# Patient Record
Sex: MALE | Race: WHITE | NOT HISPANIC OR LATINO | Employment: STUDENT | URBAN - METROPOLITAN AREA
[De-identification: names, ages, dates, MRNs, and addresses within clinical notes are randomized per-mention and may not be internally consistent; named-entity substitution may affect disease eponyms.]

---

## 2017-01-20 ENCOUNTER — PATIENT MESSAGE (OUTPATIENT)
Dept: PEDIATRICS | Facility: CLINIC | Age: 22
End: 2017-01-20

## 2017-01-20 ENCOUNTER — OFFICE VISIT (OUTPATIENT)
Dept: PEDIATRICS | Facility: CLINIC | Age: 22
End: 2017-01-20
Payer: COMMERCIAL

## 2017-01-20 VITALS
RESPIRATION RATE: 18 BRPM | BODY MASS INDEX: 20.13 KG/M2 | SYSTOLIC BLOOD PRESSURE: 107 MMHG | HEART RATE: 91 BPM | TEMPERATURE: 99 F | DIASTOLIC BLOOD PRESSURE: 69 MMHG | WEIGHT: 128.5 LBS

## 2017-01-20 DIAGNOSIS — J02.9 PHARYNGITIS, UNSPECIFIED ETIOLOGY: Primary | ICD-10-CM

## 2017-01-20 DIAGNOSIS — G40.909 SEIZURE DISORDER: ICD-10-CM

## 2017-01-20 DIAGNOSIS — R50.9 OTHER SPECIFIED FEVER: ICD-10-CM

## 2017-01-20 LAB
CTP QC/QA: YES
S PYO RRNA THROAT QL PROBE: NEGATIVE

## 2017-01-20 PROCEDURE — 87081 CULTURE SCREEN ONLY: CPT

## 2017-01-20 PROCEDURE — 99999 PR PBB SHADOW E&M-EST. PATIENT-LVL III: CPT | Mod: PBBFAC,,, | Performed by: PEDIATRICS

## 2017-01-20 PROCEDURE — 99213 OFFICE O/P EST LOW 20 MIN: CPT | Mod: S$GLB,,, | Performed by: PEDIATRICS

## 2017-01-20 PROCEDURE — 87880 STREP A ASSAY W/OPTIC: CPT | Mod: QW,S$GLB,, | Performed by: PEDIATRICS

## 2017-01-20 NOTE — MR AVS SNAPSHOT
[facility]  Erik Mazariegos eddie [place] [address]  Phone: [phone]                  Eddie Light   2017 2:00 PM   Office Visit    Description:  Male : 1995   Provider:  Shahrzad Saldivar MD   Department:  [facility]           Reason for Visit     Sore Throat     Fever                To Do List           Future Appointments        Provider Department Dept Phone    2017 2:00 PM Shahrzad Saldivar MD [facility] [phone]      Goals (5 Years of Data)     None      [facility]     [facility] -  Assistance  Registered nurses in the [facility] provide clinical advisement, health education, appointment booking, and other advisory services.  Call for this free service at [phone].             Medications           Message regarding Medications     Verify the changes and/or additions to your medication regime listed below are the same as discussed with your clinician today.  If any of these changes or additions are incorrect, please notify your healthcare provider.             Verify that the below list of medications is an accurate representation of the medications you are currently taking.  If none reported, the list may be blank. If incorrect, please contact your healthcare provider. Carry this list with you in case of emergency.           Current Medications     levetiracetam (KEPPRA) 500 MG Tab Take 1 tablet (500 mg total) by mouth 2 (two) times daily.    multivitamin (ONE DAILY MULTIVITAMIN) per tablet Take 1 tablet by mouth once daily.    lisdexamfetamine (VYVANSE) 50 MG capsule Take 1 capsule (50 mg total) by mouth every morning.           Clinical Reference Information           Vital Signs - Last Recorded  Most recent update: 2017  1:59 PM by Gabriela To MA    BP Pulse Temp Resp Wt BMI    107/69 91 98.7 °F (37.1 °C) (Oral) 18 58.3 kg (128 lb 8.5 oz) (11 %, Z=  -1.25)* 20.13 kg/m2 (15 %, Z= -1.05)*    *Growth percentiles are based on [facility] 2-20 Years data.      Blood Pressure          Most Recent Value    BP  107/69      Allergies as of 1/20/2017     No Known Allergies      Immunizations Administered on Date of Encounter - 1/20/2017     None

## 2017-01-20 NOTE — PROGRESS NOTES
Patient presents for visit accompanied by parent  Dad    CC: throat    HPI: Reports throat concern: for 2 days Hurts more to swallow Pain is mild to moderate at times Worse today. Throat looks red. Has had fever No headache   Denies cough, congestion No vomiting  No ear pain No diarrhea.    IMMUNIZATIONS:reviewed  PMHx reviewed  Medications and allergies reviewed  SH:lives with family  ROS:   CONSTITUTIONAL:alert, interactive   EYES:no eye discharge   ENT:see HPI   RESP:nl breathing, no wheezing or shortness of breath   GI:see HPI   SKIN:no rash  PHYS. EXAM:vital signs have reviewed   GEN:well nourished, well developed. Pain 0/10   SKIN:normal skin turgor, no lesions    EYES:PERRLA, nl conjunctiva   EARS:nl pinnae, TM's intact, right TM nl, left TM nl   NASAL:mucosa pink, no congestion, no discharge, oropharynx-mucus membranes moist, pharynx erythema   LYMPH:no cervical nodes    NECK:supple, no masses   RESP:nl resp. effort, clear to auscultation   HEART:RRR no murmur   ABD: positive BS, soft NT/ND   MS:nl tone and motor movement of extremities   PSYCH:in no acute distress, appropriate and interactive    ORDERS:strep test, culture done if strep negative    IMP:pharyngitis  Fever  Seizure disorder    PLAN:  Treat pain or fever with acetaminophen or Ibuprofen as directed   Education push clear fluids,soft bland foods;   Education on safe use of lozenges and gargle if age appropriate  Education cause and treatment.  Ed risk seizure when sick  keppra only daily now as he was too tired for it.  Call with concerns.Return if symptoms persist, worsen, or if new signs or symptoms develop. Follow up at well check and prn.

## 2017-01-23 LAB — BACTERIA THROAT CULT: NORMAL

## 2017-05-03 DIAGNOSIS — H91.93 HEARING DIFFICULTY, BILATERAL: Primary | ICD-10-CM

## 2017-05-04 ENCOUNTER — OFFICE VISIT (OUTPATIENT)
Dept: OTOLARYNGOLOGY | Facility: CLINIC | Age: 22
End: 2017-05-04
Payer: COMMERCIAL

## 2017-05-04 ENCOUNTER — CLINICAL SUPPORT (OUTPATIENT)
Dept: AUDIOLOGY | Facility: CLINIC | Age: 22
End: 2017-05-04
Payer: COMMERCIAL

## 2017-05-04 VITALS
DIASTOLIC BLOOD PRESSURE: 74 MMHG | HEART RATE: 76 BPM | WEIGHT: 131.38 LBS | BODY MASS INDEX: 19.46 KG/M2 | SYSTOLIC BLOOD PRESSURE: 106 MMHG | HEIGHT: 69 IN

## 2017-05-04 DIAGNOSIS — Z01.10 PASSED HEARING SCREENING: Primary | ICD-10-CM

## 2017-05-04 DIAGNOSIS — H91.93 HEARING DIFFICULTY, BILATERAL: ICD-10-CM

## 2017-05-04 DIAGNOSIS — Z01.10 EXAMINATION OF EARS AND HEARING: Primary | ICD-10-CM

## 2017-05-04 PROCEDURE — 92557 COMPREHENSIVE HEARING TEST: CPT | Mod: S$GLB,,, | Performed by: AUDIOLOGIST

## 2017-05-04 PROCEDURE — 99999 PR PBB SHADOW E&M-EST. PATIENT-LVL III: CPT | Mod: PBBFAC,,, | Performed by: NURSE PRACTITIONER

## 2017-05-04 PROCEDURE — 92567 TYMPANOMETRY: CPT | Mod: S$GLB,,, | Performed by: AUDIOLOGIST

## 2017-05-04 PROCEDURE — 99203 OFFICE O/P NEW LOW 30 MIN: CPT | Mod: S$GLB,,, | Performed by: NURSE PRACTITIONER

## 2017-05-04 NOTE — PROGRESS NOTES
Audiological evaluation completed per order from Krystal Mon due to concerns about patient's hearing.     Audiogram results were reviewed in detail with patient and all questions were answered.    Results indicate hearing within amy limits, bilaterally.   Discussed the option of pursuing Auditory Processing Testing to rule out Auditory Processing Disorder.

## 2017-05-04 NOTE — PROGRESS NOTES
Subjective:       Patient ID: Eddie Light is a 21 y.o. male.    Chief Complaint: Hearing Loss    HPI   Patient has difficulty making out words. Not sure if hearing loss. 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 No h/o failed hearing test in the past.     Review of Systems   Constitutional: Negative.    HENT: Positive for hearing loss.    Eyes: Negative.    Respiratory: Negative.    Cardiovascular: Negative.    Gastrointestinal: Negative.    Musculoskeletal: Negative.    Skin: Negative.    Neurological: Negative.    Hematological: Negative.    Psychiatric/Behavioral: Negative.        Objective:      Physical Exam   Constitutional: He is oriented to person, place, and time. Vital signs are normal. He appears well-developed and well-nourished. He is cooperative. He does not appear ill. No distress.   HENT:   Head: Normocephalic and atraumatic.   Right Ear: Hearing, tympanic membrane, external ear and ear canal normal. Tympanic membrane is not erythematous. No middle ear effusion.   Left Ear: Hearing, tympanic membrane, external ear and ear canal normal. Tympanic membrane is not erythematous.  No middle ear effusion.   Nose: Nose normal. No mucosal edema or rhinorrhea. Right sinus exhibits no maxillary sinus tenderness and no frontal sinus tenderness. Left sinus exhibits no maxillary sinus tenderness and no frontal sinus tenderness.   Mouth/Throat: Uvula is midline, oropharynx is clear and moist and mucous membranes are normal. Mucous membranes are not pale, not dry and not cyanotic. No oral lesions. No oropharyngeal exudate, posterior oropharyngeal edema or posterior oropharyngeal erythema.   Eyes: Conjunctivae, EOM and lids are normal. Pupils are equal, round, and reactive to light. Right eye exhibits no discharge. Left eye exhibits no discharge. No scleral icterus.   Neck: Trachea normal and normal range of motion. Neck supple. No tracheal deviation present. No thyroid mass and  no thyromegaly present.   Cardiovascular: Normal rate.    Pulmonary/Chest: Effort normal. No stridor. No respiratory distress. He has no wheezes.   Musculoskeletal: Normal range of motion.   Lymphadenopathy:        Head (right side): No submental, no submandibular, no tonsillar, no preauricular and no posterior auricular adenopathy present.        Head (left side): No submental, no submandibular, no tonsillar, no preauricular and no posterior auricular adenopathy present.     He has no cervical adenopathy.        Right cervical: No superficial cervical and no posterior cervical adenopathy present.       Left cervical: No superficial cervical and no posterior cervical adenopathy present.   Neurological: He is alert and oriented to person, place, and time. He has normal strength. Coordination and gait normal.   Skin: Skin is warm, dry and intact. No lesion and no rash noted. He is not diaphoretic. No cyanosis. No pallor.   Psychiatric: He has a normal mood and affect. His speech is normal and behavior is normal. Judgment and thought content normal. Cognition and memory are normal.   Nursing note and vitals reviewed.      Assessment:     Hearing is WNL AU with excellent speech discrimination AU      Plan:     Reassurance    Copy of audiogram given  Return to clinic as needed for any ENT concerns

## 2017-09-12 ENCOUNTER — PATIENT MESSAGE (OUTPATIENT)
Dept: NEUROLOGY | Facility: CLINIC | Age: 22
End: 2017-09-12

## 2017-09-12 RX ORDER — LEVETIRACETAM 500 MG/1
TABLET ORAL
Qty: 60 TABLET | Refills: 0 | Status: SHIPPED | OUTPATIENT
Start: 2017-09-12 | End: 2017-11-07 | Stop reason: SDUPTHER

## 2017-09-12 NOTE — TELEPHONE ENCOUNTER
The patient has not been seen in >1 year.  This refill has been approved, but follow up will be necessary for further refills.

## 2017-09-13 ENCOUNTER — TELEPHONE (OUTPATIENT)
Dept: NEUROLOGY | Facility: CLINIC | Age: 22
End: 2017-09-13

## 2017-09-13 NOTE — TELEPHONE ENCOUNTER
----- Message from Mark Petit sent at 9/13/2017  9:22 AM CDT -----  Contact: Mother - Moon Light  States that the patient had another seizure last Sunday, 09/10/2017 and would like to make a follow up appointment to be seen.  Can you please call Moon back at [phone].  Thank you

## 2017-09-13 NOTE — TELEPHONE ENCOUNTER
Returned call and spoke with patient's mother Moon. Moon stated that the patient did miss his medication for 3 days prior. Patient received Black eye from seizure. Patient went to [place] and spent 3 hours. Patient had a CT done. CT was normal and patient was discharged. Moon stated that when he does take the medication it works. Instruct to make sure the patient takes his medication as prescribed and for him not to miss a dose. Reminded her that the patient can not drive, be around power tools, bodies of water, and must take showers not bathes. Moon verbalized understanding. Please advise.

## 2017-09-14 NOTE — TELEPHONE ENCOUNTER
I saw this patient once, and that was over a year and a half ago.  He should have had follow up well before now.  Did an appointment not get made or did they cancel/no show and not reschedule?  In either case, he needs to have a follow up appointment made.  The next available slot is fine.

## 2017-11-07 RX ORDER — LEVETIRACETAM 500 MG/1
TABLET ORAL
Qty: 60 TABLET | Refills: 0 | Status: SHIPPED | OUTPATIENT
Start: 2017-11-07 | End: 2018-01-09 | Stop reason: SDUPTHER

## 2017-11-13 ENCOUNTER — OFFICE VISIT (OUTPATIENT)
Dept: FAMILY MEDICINE | Facility: CLINIC | Age: 22
End: 2017-11-13
Payer: COMMERCIAL

## 2017-11-13 ENCOUNTER — LAB VISIT (OUTPATIENT)
Dept: LAB | Facility: HOSPITAL | Age: 22
End: 2017-11-13
Attending: NURSE PRACTITIONER
Payer: COMMERCIAL

## 2017-11-13 VITALS
HEART RATE: 75 BPM | BODY MASS INDEX: 19.79 KG/M2 | WEIGHT: 133.63 LBS | HEIGHT: 69 IN | SYSTOLIC BLOOD PRESSURE: 110 MMHG | OXYGEN SATURATION: 98 % | RESPIRATION RATE: 20 BRPM | TEMPERATURE: 98 F | DIASTOLIC BLOOD PRESSURE: 70 MMHG

## 2017-11-13 DIAGNOSIS — K59.00 CONSTIPATION, UNSPECIFIED CONSTIPATION TYPE: ICD-10-CM

## 2017-11-13 DIAGNOSIS — Z00.00 WELLNESS EXAMINATION: ICD-10-CM

## 2017-11-13 DIAGNOSIS — G40.909 SEIZURE DISORDER: ICD-10-CM

## 2017-11-13 DIAGNOSIS — Z00.00 WELLNESS EXAMINATION: Primary | ICD-10-CM

## 2017-11-13 LAB
ALBUMIN SERPL BCP-MCNC: 4 G/DL
ALP SERPL-CCNC: 89 U/L
ALT SERPL W/O P-5'-P-CCNC: 191 U/L
ANION GAP SERPL CALC-SCNC: 7 MMOL/L
AST SERPL-CCNC: 577 U/L
BASOPHILS # BLD AUTO: 0.02 K/UL
BASOPHILS NFR BLD: 0.5 %
BILIRUB SERPL-MCNC: 1 MG/DL
BUN SERPL-MCNC: 10 MG/DL
CALCIUM SERPL-MCNC: 9.7 MG/DL
CHLORIDE SERPL-SCNC: 104 MMOL/L
CHOLEST SERPL-MCNC: 165 MG/DL
CHOLEST/HDLC SERPL: 3.6 {RATIO}
CO2 SERPL-SCNC: 29 MMOL/L
CREAT SERPL-MCNC: 0.9 MG/DL
DIFFERENTIAL METHOD: NORMAL
EOSINOPHIL # BLD AUTO: 0.1 K/UL
EOSINOPHIL NFR BLD: 1.4 %
ERYTHROCYTE [DISTWIDTH] IN BLOOD BY AUTOMATED COUNT: 12.7 %
EST. GFR  (AFRICAN AMERICAN): >60 ML/MIN/1.73 M^2
EST. GFR  (NON AFRICAN AMERICAN): >60 ML/MIN/1.73 M^2
GLUCOSE SERPL-MCNC: 80 MG/DL
HCT VFR BLD AUTO: 42.7 %
HDLC SERPL-MCNC: 46 MG/DL
HDLC SERPL: 27.9 %
HGB BLD-MCNC: 14 G/DL
IMM GRANULOCYTES # BLD AUTO: 0.01 K/UL
IMM GRANULOCYTES NFR BLD AUTO: 0.2 %
LDLC SERPL CALC-MCNC: 106.8 MG/DL
LYMPHOCYTES # BLD AUTO: 1.5 K/UL
LYMPHOCYTES NFR BLD: 33.3 %
MCH RBC QN AUTO: 28.6 PG
MCHC RBC AUTO-ENTMCNC: 32.8 G/DL
MCV RBC AUTO: 87 FL
MONOCYTES # BLD AUTO: 0.4 K/UL
MONOCYTES NFR BLD: 9.3 %
NEUTROPHILS # BLD AUTO: 2.4 K/UL
NEUTROPHILS NFR BLD: 55.3 %
NONHDLC SERPL-MCNC: 119 MG/DL
NRBC BLD-RTO: 0 /100 WBC
PLATELET # BLD AUTO: 224 K/UL
PMV BLD AUTO: 11.1 FL
POTASSIUM SERPL-SCNC: 4.5 MMOL/L
PROT SERPL-MCNC: 7.1 G/DL
RBC # BLD AUTO: 4.9 M/UL
SODIUM SERPL-SCNC: 140 MMOL/L
TRIGL SERPL-MCNC: 61 MG/DL
TSH SERPL DL<=0.005 MIU/L-ACNC: 0.55 UIU/ML
WBC # BLD AUTO: 4.39 K/UL

## 2017-11-13 PROCEDURE — 84443 ASSAY THYROID STIM HORMONE: CPT

## 2017-11-13 PROCEDURE — 80061 LIPID PANEL: CPT

## 2017-11-13 PROCEDURE — 99999 PR PBB SHADOW E&M-EST. PATIENT-LVL III: CPT | Mod: PBBFAC,,, | Performed by: NURSE PRACTITIONER

## 2017-11-13 PROCEDURE — 80053 COMPREHEN METABOLIC PANEL: CPT

## 2017-11-13 PROCEDURE — 99204 OFFICE O/P NEW MOD 45 MIN: CPT | Mod: S$GLB,,, | Performed by: NURSE PRACTITIONER

## 2017-11-13 PROCEDURE — 85025 COMPLETE CBC W/AUTO DIFF WBC: CPT

## 2017-11-13 PROCEDURE — 36415 COLL VENOUS BLD VENIPUNCTURE: CPT | Mod: PO

## 2017-11-13 NOTE — PROGRESS NOTES
Subjective:       Patient ID: Eddie Light is a 22 y.o. male.    Chief Complaint: Annual Exam    HPI   Mr. Light is a new patient to me and family practice. He presents today to establish care/wellness exam. Was being followed by zeyad Choi. PMHX includes seizure disorder (preemie). Followed by neuro--next appt with Dr Rowell on 11/20. Last seizure 9/10/17.   Vitals:    11/13/17 1209   BP: 110/70   Pulse: 75   Resp: 20   Temp: 97.6 °F (36.4 °C)     Review of Systems   Constitutional: Negative.  Negative for diaphoresis and fever.   HENT: Negative.  Negative for facial swelling and trouble swallowing.    Eyes: Negative.  Negative for discharge and redness.   Respiratory: Negative.  Negative for cough and shortness of breath.    Cardiovascular: Negative.  Negative for chest pain and palpitations.   Gastrointestinal: Positive for constipation. Negative for abdominal pain and diarrhea.   Genitourinary: Negative.  Negative for difficulty urinating and flank pain.   Musculoskeletal: Negative.  Negative for back pain and neck pain.   Skin: Negative.  Negative for rash and wound.   Neurological: Negative.  Negative for dizziness and light-headedness.   Hematological: Negative.    Psychiatric/Behavioral: Negative.  Negative for confusion. The patient is not nervous/anxious.        Past Medical History:   Diagnosis Date    ADHD (attention deficit hyperactivity disorder)     Headache(784.0)     Nevus     seen by Derm    Otitis media     Seizures 2006    x 1    Short stature for age     Dr. Elena Garcia    Strep throat     Wears glasses     Dr. Lara     Objective:      Physical Exam   Constitutional: He is oriented to person, place, and time. He does not have a sickly appearance. No distress.   HENT:   Head: Normocephalic.   Right Ear: Hearing normal.   Left Ear: Hearing normal.   Nose: Nose normal.   Eyes: Conjunctivae and lids are normal.   Neck: Trachea normal. No JVD present.   Cardiovascular:  Normal rate, regular rhythm, S1 normal, S2 normal and normal heart sounds.    Pulmonary/Chest: Effort normal and breath sounds normal. He exhibits no tenderness.   Abdominal: Normal appearance. He exhibits no distension.   Musculoskeletal: Normal range of motion. He exhibits no edema or deformity.   Neurological: He is alert and oriented to person, place, and time.   Skin: Skin is warm and dry. He is not diaphoretic. No pallor.   Psychiatric: He has a normal mood and affect. His speech is normal and behavior is normal. Judgment and thought content normal. Cognition and memory are normal.   Nursing note and vitals reviewed.      Assessment:       1. Wellness examination    2. Seizure disorder    3. Constipation, unspecified constipation type        Plan:       Wellness examination  -     CBC auto differential; Future; Expected date: 11/13/2017  -     Comprehensive metabolic panel; Future; Expected date: 11/13/2017  -     Lipid panel; Future; Expected date: 11/13/2017  -     TSH; Future; Expected date: 11/13/2017    Seizure disorder   Follow up with Dr Rowell as scheduled next week    Constipation   Increase water intake   High fiber diet    Follow up to establish with Dr Aguilera  Follow up yearly and PRN

## 2017-11-14 ENCOUNTER — TELEPHONE (OUTPATIENT)
Dept: FAMILY MEDICINE | Facility: CLINIC | Age: 22
End: 2017-11-14

## 2017-11-14 ENCOUNTER — PATIENT MESSAGE (OUTPATIENT)
Dept: FAMILY MEDICINE | Facility: CLINIC | Age: 22
End: 2017-11-14

## 2017-11-14 DIAGNOSIS — R79.89 ELEVATED LFTS: Primary | ICD-10-CM

## 2017-11-14 NOTE — TELEPHONE ENCOUNTER
Please call and tell him his liver enzymes are elevated--need further workup. Ask him if he drinks alcohol and if so how much?    Schedule for labs and ultrasound

## 2017-11-14 NOTE — TELEPHONE ENCOUNTER
Attempted to contact patient. Spoke with patient's father. He will have patient return call to clinic.

## 2017-11-15 ENCOUNTER — PATIENT MESSAGE (OUTPATIENT)
Dept: FAMILY MEDICINE | Facility: CLINIC | Age: 22
End: 2017-11-15

## 2017-11-15 ENCOUNTER — HOSPITAL ENCOUNTER (OUTPATIENT)
Dept: RADIOLOGY | Facility: HOSPITAL | Age: 22
Discharge: HOME | End: 2017-11-15
Attending: NURSE PRACTITIONER
Payer: COMMERCIAL

## 2017-11-15 DIAGNOSIS — R79.89 ELEVATED LFTS: ICD-10-CM

## 2017-11-15 PROCEDURE — 76705 ECHO EXAM OF ABDOMEN: CPT | Mod: 26,,, | Performed by: RADIOLOGY

## 2017-11-15 PROCEDURE — 76705 ECHO EXAM OF ABDOMEN: CPT | Mod: TC,PO

## 2017-11-16 ENCOUNTER — PATIENT MESSAGE (OUTPATIENT)
Dept: FAMILY MEDICINE | Facility: CLINIC | Age: 22
End: 2017-11-16

## 2017-11-16 ENCOUNTER — TELEPHONE (OUTPATIENT)
Dept: FAMILY MEDICINE | Facility: CLINIC | Age: 22
End: 2017-11-16

## 2017-11-16 DIAGNOSIS — R82.998 DARK URINE: ICD-10-CM

## 2017-11-16 DIAGNOSIS — R79.89 ELEVATED LFTS: Primary | ICD-10-CM

## 2017-11-17 ENCOUNTER — PATIENT MESSAGE (OUTPATIENT)
Dept: FAMILY MEDICINE | Facility: CLINIC | Age: 22
End: 2017-11-17

## 2017-11-20 ENCOUNTER — OFFICE VISIT (OUTPATIENT)
Dept: NEUROLOGY | Facility: CLINIC | Age: 22
End: 2017-11-20
Payer: COMMERCIAL

## 2017-11-20 VITALS
HEIGHT: 69 IN | SYSTOLIC BLOOD PRESSURE: 108 MMHG | DIASTOLIC BLOOD PRESSURE: 63 MMHG | HEART RATE: 75 BPM | WEIGHT: 134.13 LBS | BODY MASS INDEX: 19.87 KG/M2 | RESPIRATION RATE: 20 BRPM

## 2017-11-20 DIAGNOSIS — G40.209 LOCALIZATION-RELATED SYMPTOMATIC EPILEPSY AND EPILEPTIC SYNDROMES WITH COMPLEX PARTIAL SEIZURES, NOT INTRACTABLE, WITHOUT STATUS EPILEPTICUS: Primary | ICD-10-CM

## 2017-11-20 DIAGNOSIS — G72.9 MYOPATHY: ICD-10-CM

## 2017-11-20 DIAGNOSIS — R74.01 TRANSAMINITIS: ICD-10-CM

## 2017-11-20 PROCEDURE — 99215 OFFICE O/P EST HI 40 MIN: CPT | Mod: S$GLB,,, | Performed by: PSYCHIATRY & NEUROLOGY

## 2017-11-20 PROCEDURE — 99999 PR PBB SHADOW E&M-EST. PATIENT-LVL III: CPT | Mod: PBBFAC,,, | Performed by: PSYCHIATRY & NEUROLOGY

## 2017-11-20 NOTE — PROGRESS NOTES
Date of service:  2017    Chief complaint:  Seizures    Interval history:  The patient is a 22 y.o. male seen once previously in  for seizures.  At that time, we tried transitioning him from Zonegran to Lamictal.  He reports that he had significant drowsiness.  We then transitioned to Keppra.  He notes no side effects on this.  Since I last saw him, he has had further seizures, most recently in .  His mother indicates that he has not been taking his medication faithfully with these.    History of present illness:  The patient is a 22 y.o. male referred for evaluation of episodes suspicious for seizures. These began at about age 10-12.  The patient has no consistent warning, though he says that he has seen a red light before one event.  His seizure is characterized by generalized stiffening and shaking.  There is a history of tongue biting.  This component of this spell lasts for approximately 2 minutes.  Afterwards, he reports confusion/fatigue.  The patient's frequency of events is roughly once a year.    The patient has no family history of seizures.  He reports a history of prenatal/ complications with the patient having been born prematurely. There is no history of febrile seizures.  He notes no history of CNS infections. He claims no history of significant head trauma. There is a history of developmental abnormalities on his MRI of the brain as described below.    He was initially placed on phenobarbital, which caused drowsiness.  He is now on Zonegran 200 mg nightly.  This may also be causing some drowsiness.    Also of note, the patient reports relatively frequent headaches as well as muscle aches.    Current AEDs:  Zonegran 200 mg QHS    Prior AEDs:  Phenobarbital (lethargy)      Past Medical History:   Diagnosis Date    ADHD (attention deficit hyperactivity disorder)     Headache(784.0)     Nevus     seen by Derm    Otitis media     Seizures 2006    x 1    Short stature for age   "   Dr. Elena Garcia    Strep throat     Wears glasses     Dr. Lara       Past Surgical History:   Procedure Laterality Date    HERNIA REPAIR      TYMPANOSTOMY TUBE PLACEMENT         Family history:  History reviewed. No pertinent family history.  No reported FH of seizures      Social history:  Social History     Social History    Marital status: Single     Spouse name: N/A    Number of children: N/A    Years of education: N/A     Occupational History    Not on file.     Social History Main Topics    Smoking status: Never Smoker    Smokeless tobacco: Never Used    Alcohol use No    Drug use: No    Sexual activity: Not on file     Other Topics Concern    Not on file     Social History Narrative    No narrative on file   No reported T/E/D       Review of Systems  General/Constitutional:  No unintentional weight loss, No change in appetite  Eyes/Vision:  No change in vision, No double vision  ENT:  No frequent nose bleeds, No ringing in the ears  Respiratory:  No cough, No wheezing  Cardiovascular:  No chest pain, No palpitations  Gastrointestinal:  No jaundice, No nausea/vomiting  Genitourinary:  No incontinence, No burning with urination  Hematologic/Lymphatic:  No easy bruising/bleeding, No night sweats  Neurological:  No numbness, No weakness  Endocrine:  + fatigue, No heat/cold intolerance  Allergy/Immunologic:  No fevers, No chills  Musculoskeletal:  + muscle pain, No joint pain   Psychiatric:  No thoughts of harming self/others, No depression  Integumentary:  No rashes, No sores that do not heal     Physical exam:  /63 (BP Location: Right arm, Patient Position: Sitting, BP Method: Medium (Automatic))   Pulse 75   Resp 20   Ht 5' 9" (1.753 m)   Wt 60.9 kg (134 lb 2.4 oz)   BMI 19.81 kg/m²   General: Well developed, well nourished.  No acute distress.  HEENT: Atraumatic, normocephalic.  Neck: Supple, trachea midline.  Cardiovascular: Regular rate and rhythm.  Pulmonary: No " "increased work of breathing.  Abdomen/GI: No guarding.  Musculoskeletal: No obvious joint deformities, moves all extremities well.    Neurological exam:   Mental status: Awake and alert.  Oriented x4.  Speech fluent and appropriate.  Recent and remote memory appear to be grossly intact.  Fund of knowledge grossly normal.  Cranial nerves: Pupils equal round and reactive to light, extraocular movements intact, facial strength and sensation intact bilaterally, palate and tongue midline, hearing grossly intact bilaterally.  Motor: 5 out of 5 strength throughout the upper and lower extremities bilaterally. Normal bulk and tone.  Sensation: Intact to light touch and temperature bilaterally.  DTR: 2+ at the knees and biceps bilaterally.  Coordination: Finger-nose-finger testing intact bilaterally.  Gait: Normal gait. Good tandem.    Data base:  Notes of the referring physician were reviewed.  Briefly summarized, these discussed his recent seizure history and indicated that the patient was being transitioned from phenobarbital to Zonegran secondary to lethargy.    Labs:  CMP (11/13/17): includes SZJ=618 and ALT=191  CBC (11/13/17): normal  CK (3/16/16): 211 (previously 305)    MRI brain (2012):  "1. 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  2. 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 This is not optimally evaluated secondary to motion artifact and metal artifact within the mouth.  3. Dysgenesis of the corpus callosum.  4. Fluid within right mastoid air cells. Please correlate clinically for symptoms of mastoiditis."    EEG (11/15):  "Normal EEG"    Assessment and plan:  The patient is a 22 y.o. male referred for evaluation for seizures. 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  As far as medications go, we will continue him on Keppra. Medication side effects were discussed " with the patient.  Serologies are pending for medication monitoring purposes.  State law as it pertains to driving for individuals with seizures was discussed.  The patient was also counseled on seizure safety.      We also discussed his recent elevated LFTs and history of mildly elevated CKs.  I did raise the question of a genetics consult, provided no obvious cause of hepatitis is found, as we might be seeing a manifestation of an underlying genetic issue in multiple organ systems at that point.  Certainly, there are a number of identified genetic etiologies for periventricular nodular heterotopia.  The classic version is X linked dominant and is usually fatal in males, so I would not suspect this diagnosis for Mr. Light's case.  An autosomal recessive ARFGEF2 mutation, it is my understanding, typically causes more severe cognitive issues than this patient suffers from.  Consequently, I think this less likely as well.  There are variants of Estelita-Danlos syndrome associated with both periventricular nodular heterotopia, and hepatic issues can also arise in Estelita-Danlos patients.  Muscle involvement with Estelita-Danlos is relatively rare.  Nonetheless, this may warrant further consideration.  Additionally, we could consider EMG testing and/or muscle biopsy.    We will plan on seeing the patient back in a few months.

## 2017-12-01 ENCOUNTER — LAB VISIT (OUTPATIENT)
Dept: LAB | Facility: HOSPITAL | Age: 22
End: 2017-12-01
Attending: NURSE PRACTITIONER
Payer: COMMERCIAL

## 2017-12-01 DIAGNOSIS — R79.89 ELEVATED LFTS: ICD-10-CM

## 2017-12-01 DIAGNOSIS — R82.998 DARK URINE: ICD-10-CM

## 2017-12-01 LAB
ALBUMIN SERPL BCP-MCNC: 4.2 G/DL
ALP SERPL-CCNC: 97 U/L
ALT SERPL W/O P-5'-P-CCNC: 17 U/L
AST SERPL-CCNC: 19 U/L
BILIRUB DIRECT SERPL-MCNC: 0.3 MG/DL
BILIRUB SERPL-MCNC: 0.7 MG/DL
CK SERPL-CCNC: 161 U/L
PROT SERPL-MCNC: 7.2 G/DL

## 2017-12-01 PROCEDURE — 36415 COLL VENOUS BLD VENIPUNCTURE: CPT | Mod: PO

## 2017-12-01 PROCEDURE — 80076 HEPATIC FUNCTION PANEL: CPT

## 2017-12-01 PROCEDURE — 82550 ASSAY OF CK (CPK): CPT

## 2017-12-04 ENCOUNTER — PATIENT MESSAGE (OUTPATIENT)
Dept: FAMILY MEDICINE | Facility: CLINIC | Age: 22
End: 2017-12-04

## 2017-12-04 ENCOUNTER — TELEPHONE (OUTPATIENT)
Dept: FAMILY MEDICINE | Facility: CLINIC | Age: 22
End: 2017-12-04

## 2017-12-04 NOTE — TELEPHONE ENCOUNTER
Please call and tell him repeat labs and urine were all normal---his liver enzyme elevation could have been related to a self-limiting virus. Advise him to follow up to establish with Dr Aguilera in February as scheduled

## 2017-12-05 NOTE — TELEPHONE ENCOUNTER
----- Message from Yamila Peters sent at 12/4/2017  2:06 PM CST -----  Contact: self  Call placed to pod. Returning your call. Please call back at [phone] (rigd)

## 2017-12-06 ENCOUNTER — OFFICE VISIT (OUTPATIENT)
Dept: OPTOMETRY | Facility: CLINIC | Age: 22
End: 2017-12-06
Payer: COMMERCIAL

## 2017-12-06 DIAGNOSIS — H52.13 MYOPIA WITH ASTIGMATISM, BILATERAL: Primary | ICD-10-CM

## 2017-12-06 DIAGNOSIS — H52.203 MYOPIA WITH ASTIGMATISM, BILATERAL: Primary | ICD-10-CM

## 2017-12-06 PROCEDURE — 99499 UNLISTED E&M SERVICE: CPT | Mod: S$GLB,,, | Performed by: OPTOMETRIST

## 2017-12-06 PROCEDURE — 92014 COMPRE OPH EXAM EST PT 1/>: CPT | Mod: S$GLB,,, | Performed by: OPTOMETRIST

## 2017-12-06 PROCEDURE — 92015 DETERMINE REFRACTIVE STATE: CPT | Mod: S$GLB,,, | Performed by: OPTOMETRIST

## 2017-12-06 PROCEDURE — 99999 PR PBB SHADOW E&M-EST. PATIENT-LVL II: CPT | Mod: PBBFAC,,, | Performed by: OPTOMETRIST

## 2017-12-06 NOTE — PROGRESS NOTES
[facility] [place]     Routine eye exam--dle--10/19/16    Pt denies any changes since last visit. Needing updated contact lens. No   eye pain. No floaters/flashes.        Last edited by Elza Carcamo on 12/6/2017  8:10 AM. (History)            Assessment /Plan     For exam results, see Encounter Report.    Myopia with astigmatism, bilateral      1. Spec Rx given and  Contact lens trials dispensed to pt. Daily wear only advised, with education to risks of extended wear.  Discussed lens care, compliance and solutions. Different lens options discussed with patient. RTC 1 year full exam.

## 2017-12-06 NOTE — PROGRESS NOTES
Assessment /Plan     For exam results, see Encounter Report.    Myopia with astigmatism, bilateral      1. See other exam same date for clfu.

## 2018-01-09 RX ORDER — LEVETIRACETAM 500 MG/1
500 TABLET ORAL 2 TIMES DAILY
Qty: 60 TABLET | Refills: 3 | Status: SHIPPED | OUTPATIENT
Start: 2018-01-09 | End: 2018-08-09

## 2018-01-09 RX ORDER — LEVETIRACETAM 500 MG/1
TABLET ORAL
Qty: 60 TABLET | Refills: 0 | Status: CANCELLED | OUTPATIENT
Start: 2018-01-09

## 2018-02-26 ENCOUNTER — OFFICE VISIT (OUTPATIENT)
Dept: FAMILY MEDICINE | Facility: CLINIC | Age: 23
End: 2018-02-26
Payer: COMMERCIAL

## 2018-02-26 VITALS
RESPIRATION RATE: 18 BRPM | DIASTOLIC BLOOD PRESSURE: 70 MMHG | SYSTOLIC BLOOD PRESSURE: 106 MMHG | OXYGEN SATURATION: 98 % | WEIGHT: 134.94 LBS | BODY MASS INDEX: 19.99 KG/M2 | HEIGHT: 69 IN | HEART RATE: 72 BPM

## 2018-02-26 DIAGNOSIS — H91.93 DECREASED HEARING OF BOTH EARS: ICD-10-CM

## 2018-02-26 DIAGNOSIS — G40.909 SEIZURE DISORDER: ICD-10-CM

## 2018-02-26 DIAGNOSIS — B35.1 ONYCHOMYCOSIS: Primary | ICD-10-CM

## 2018-02-26 PROCEDURE — 99999 PR PBB SHADOW E&M-EST. PATIENT-LVL III: CPT | Mod: PBBFAC,,, | Performed by: INTERNAL MEDICINE

## 2018-02-26 PROCEDURE — 3008F BODY MASS INDEX DOCD: CPT | Mod: S$GLB,,, | Performed by: INTERNAL MEDICINE

## 2018-02-26 PROCEDURE — 99214 OFFICE O/P EST MOD 30 MIN: CPT | Mod: S$GLB,,, | Performed by: INTERNAL MEDICINE

## 2018-02-26 RX ORDER — FLUCONAZOLE 200 MG/1
TABLET ORAL
Qty: 12 TABLET | Refills: 0 | Status: SHIPPED | OUTPATIENT
Start: 2018-02-26 | End: 2019-08-22

## 2018-02-26 NOTE — PROGRESS NOTES
Subjective:       Patient ID: Eddie Light is a 22 y.o. male.    Chief Complaint: Nail Problem    Complains of thick yellow nail for 1 year progressing despite otc tx by his mother  Seizure d/o - last seizure 5 mo ago.  On [facility]; Dr Rowell  Complains of mild decreased hearing for > 1 yr.  Hearing test here wnl - was told may be selective hearing.       Review of Systems   Constitutional: Negative for activity change, appetite change, fever and unexpected weight change.   HENT: Negative for hearing loss, nosebleeds, rhinorrhea and trouble swallowing.    Eyes: Negative for discharge and visual disturbance.   Respiratory: Negative for choking, chest tightness, shortness of breath and wheezing.    Cardiovascular: Negative for chest pain and palpitations.   Gastrointestinal: Negative for abdominal pain, blood in stool, constipation, diarrhea, nausea and vomiting.   Endocrine: Negative for polydipsia and polyuria.   Genitourinary: Negative for difficulty urinating, hematuria and urgency.   Musculoskeletal: Negative for arthralgias, joint swelling and neck pain.   Skin: Negative for rash and wound.   Neurological: Negative for dizziness, syncope, weakness and headaches.   Psychiatric/Behavioral: Negative for confusion and dysphoric mood.       Objective:      Vitals:    02/26/18 1511   BP: 106/70   Pulse: 72   Resp: 18     Physical Exam   Constitutional: He appears well-nourished.   HENT:   Scarring left TM   Eyes: Conjunctivae and EOM are normal.   Neck: Normal range of motion.   Cardiovascular: Normal rate and regular rhythm.    Pulmonary/Chest: Effort normal and breath sounds normal.   Musculoskeletal:   Normal ROM bilateral    Neurological: No cranial nerve deficit (grossly intact).   Skin: Skin is warm and dry.   Psychiatric: He has a normal mood and affect.   Alert and orientated   Vitals reviewed.        Assessment:       1. Onychomycosis    2. Seizure disorder    3. Decreased hearing of both ears        Plan:  "      Onychomycosis  -     fluconazole (DIFLUCAN) 200 MG Tab; 1 po q week for 12 weeks  Dispense: 12 tablet; Refill: 0  -     Comprehensive metabolic panel; Future; Expected date: 03/26/2018  -     CBC auto differential; Future; Expected date: 03/26/2018    Seizure disorder    Decreased hearing of both ears      Medication List with Changes/Refills   New Medications    FLUCONAZOLE (DIFLUCAN) 200 MG TAB    1 po q week for 12 weeks   Current Medications    ASPIRIN/ACETAMINOPHEN/CAFFEINE (EXCEDRIN MIGRAINE ORAL)    Take by mouth as needed.    LEVETIRACETAM (KEPPRA) 500 MG TAB    Take 1 tablet (500 mg total) by mouth 2 (two) times daily.    MULTIVITAMIN (ONE DAILY MULTIVITAMIN) PER TABLET    Take 1 tablet by mouth once daily.     Nw, podiatry   If hearing progresses, ENT Dr          Counseled on regular exercise, maintenance of a healthy weight, balanced diet rich in fruits/vegetables and lean protein, and avoidance of unhealthy habits like smoking and excessive alcohol intake.   Also, counseled on importance of being compliant with medication, health appointments, diet and exercise.     Follow-up if symptoms worsen or fail to improve. c w 1m labs    "This note will not be shared with the patient."  "

## 2018-03-26 ENCOUNTER — LAB VISIT (OUTPATIENT)
Dept: LAB | Facility: HOSPITAL | Age: 23
End: 2018-03-26
Attending: INTERNAL MEDICINE
Payer: COMMERCIAL

## 2018-03-26 DIAGNOSIS — B35.1 ONYCHOMYCOSIS: ICD-10-CM

## 2018-03-26 LAB
ALBUMIN SERPL BCP-MCNC: 4.2 G/DL
ALP SERPL-CCNC: 106 U/L
ALT SERPL W/O P-5'-P-CCNC: 19 U/L
ANION GAP SERPL CALC-SCNC: 8 MMOL/L
AST SERPL-CCNC: 27 U/L
BASOPHILS # BLD AUTO: 0.03 K/UL
BASOPHILS NFR BLD: 0.5 %
BILIRUB SERPL-MCNC: 0.7 MG/DL
BUN SERPL-MCNC: 11 MG/DL
CALCIUM SERPL-MCNC: 9.4 MG/DL
CHLORIDE SERPL-SCNC: 104 MMOL/L
CO2 SERPL-SCNC: 25 MMOL/L
CREAT SERPL-MCNC: 1 MG/DL
DIFFERENTIAL METHOD: ABNORMAL
EOSINOPHIL # BLD AUTO: 0.1 K/UL
EOSINOPHIL NFR BLD: 1.3 %
ERYTHROCYTE [DISTWIDTH] IN BLOOD BY AUTOMATED COUNT: 12.9 %
EST. GFR  (AFRICAN AMERICAN): >60 ML/MIN/1.73 M^2
EST. GFR  (NON AFRICAN AMERICAN): >60 ML/MIN/1.73 M^2
GLUCOSE SERPL-MCNC: 86 MG/DL
HCT VFR BLD AUTO: 43.8 %
HGB BLD-MCNC: 14.3 G/DL
IMM GRANULOCYTES # BLD AUTO: 0.01 K/UL
IMM GRANULOCYTES NFR BLD AUTO: 0.2 %
LYMPHOCYTES # BLD AUTO: 1.6 K/UL
LYMPHOCYTES NFR BLD: 26.1 %
MCH RBC QN AUTO: 28.9 PG
MCHC RBC AUTO-ENTMCNC: 32.6 G/DL
MCV RBC AUTO: 89 FL
MONOCYTES # BLD AUTO: 1 K/UL
MONOCYTES NFR BLD: 16.2 %
NEUTROPHILS # BLD AUTO: 3.3 K/UL
NEUTROPHILS NFR BLD: 55.7 %
NRBC BLD-RTO: 0 /100 WBC
PLATELET # BLD AUTO: 225 K/UL
PMV BLD AUTO: 10.3 FL
POTASSIUM SERPL-SCNC: 4 MMOL/L
PROT SERPL-MCNC: 7.2 G/DL
RBC # BLD AUTO: 4.95 M/UL
SODIUM SERPL-SCNC: 137 MMOL/L
WBC # BLD AUTO: 5.98 K/UL

## 2018-03-26 PROCEDURE — 85025 COMPLETE CBC W/AUTO DIFF WBC: CPT

## 2018-03-26 PROCEDURE — 80053 COMPREHEN METABOLIC PANEL: CPT

## 2018-03-26 PROCEDURE — 36415 COLL VENOUS BLD VENIPUNCTURE: CPT | Mod: PO

## 2018-04-02 ENCOUNTER — TELEPHONE (OUTPATIENT)
Dept: FAMILY MEDICINE | Facility: CLINIC | Age: 23
End: 2018-04-02

## 2018-04-02 NOTE — TELEPHONE ENCOUNTER
----- Message from Kinsey Hamilton sent at 4/2/2018  2:51 PM CDT -----  Contact: mom  Mom. Moon Nadege, [phone] is calling regarding test results.  Please advise.  Thanks!

## 2018-08-09 RX ORDER — LEVETIRACETAM 500 MG/1
1000 TABLET, EXTENDED RELEASE ORAL DAILY
Qty: 60 TABLET | Refills: 11 | Status: SHIPPED | OUTPATIENT
Start: 2018-08-09 | End: 2019-07-09 | Stop reason: SDUPTHER

## 2018-08-09 NOTE — TELEPHONE ENCOUNTER
----- Message from Alfred Sanchez sent at 8/9/2018  1:02 PM CDT -----  Contact: Mom/Moon Mustafa called in regarding the attached patient (son).  Moon wanted to see if patient could change his Keppra to 1 x daily, Extended Release?   Patient is now taking it 2 x daily but sometimes his schedule gets crazy.    Moon's call back number is [phone]

## 2018-10-19 ENCOUNTER — TELEPHONE (OUTPATIENT)
Dept: FAMILY MEDICINE | Facility: CLINIC | Age: 23
End: 2018-10-19

## 2018-10-19 NOTE — TELEPHONE ENCOUNTER
----- Message from RT Jackelyn sent at 10/19/2018  1:27 PM CDT -----  Contact: Maribel, Mother, [phone]  Maribel, Mother, [phone], requesting an appt for the pt to be worked in today with preferably Dr. ANA Aguilera for feeling very fatigue / constipation, thanks.

## 2018-12-08 ENCOUNTER — OFFICE VISIT (OUTPATIENT)
Dept: OPTOMETRY | Facility: CLINIC | Age: 23
End: 2018-12-08
Payer: COMMERCIAL

## 2018-12-08 DIAGNOSIS — Z46.0 FITTING AND ADJUSTMENT OF SPECTACLES AND CONTACT LENSES: Primary | ICD-10-CM

## 2018-12-08 DIAGNOSIS — H52.13 MYOPIA WITH ASTIGMATISM, BILATERAL: Primary | ICD-10-CM

## 2018-12-08 DIAGNOSIS — H52.203 MYOPIA WITH ASTIGMATISM, BILATERAL: Primary | ICD-10-CM

## 2018-12-08 PROCEDURE — 92014 COMPRE OPH EXAM EST PT 1/>: CPT | Mod: S$GLB,,, | Performed by: OPTOMETRIST

## 2018-12-08 PROCEDURE — 92310 CONTACT LENS FITTING OU: CPT | Mod: S$GLB,,, | Performed by: OPTOMETRIST

## 2018-12-08 PROCEDURE — 92015 DETERMINE REFRACTIVE STATE: CPT | Mod: S$GLB,,, | Performed by: OPTOMETRIST

## 2018-12-08 PROCEDURE — 99999 PR PBB SHADOW E&M-EST. PATIENT-LVL II: CPT | Mod: PBBFAC,,, | Performed by: OPTOMETRIST

## 2018-12-08 NOTE — PROGRESS NOTES
HPI     Blur os at dist, x mos, no assoc pain or red, no relief over time,   constant    Last edited by Akira Jamil, OD on 12/8/2018 10:19 AM. (History)            Assessment /Plan     For exam results, see Encounter Report.    Myopia with astigmatism, bilateral      1. Spec Rx given and Contact lens Rx released to pt. Daily wear only advised, with education to risks of extended wear.  Discussed lens care, compliance and solutions. RTC yearly contact lens follow up.   . Different lens options discussed with patient. RTC 1 year full exam.

## 2019-07-09 ENCOUNTER — TELEPHONE (OUTPATIENT)
Dept: NEUROLOGY | Facility: CLINIC | Age: 24
End: 2019-07-09

## 2019-07-09 RX ORDER — LEVETIRACETAM 500 MG/1
1000 TABLET, EXTENDED RELEASE ORAL DAILY
Qty: 60 TABLET | Refills: 0 | Status: SHIPPED | OUTPATIENT
Start: 2019-07-09 | End: 2019-09-03 | Stop reason: SDUPTHER

## 2019-07-09 NOTE — TELEPHONE ENCOUNTER
----- Message from Lance Angeles sent at 7/9/2019  8:49 AM CDT -----  Contact: Pt's mother Moon  Type:  Patient Returning Call    Who Called:  Moon  Who Left Message for Patient:  Not sure  Does the patient know what this is regarding?:  Not sure. Possible appointment or lab results  Best Call Back Number:  [phone]  Additional Information:  Pt set an appointment for 10/1/19

## 2019-08-08 ENCOUNTER — PATIENT OUTREACH (OUTPATIENT)
Dept: ADMINISTRATIVE | Facility: HOSPITAL | Age: 24
End: 2019-08-08

## 2019-08-08 NOTE — PROGRESS NOTES
Health Maintenance Due   Topic Date Due    TETANUS VACCINE  03/23/2019       Chart review completed 08/08/2019

## 2019-08-21 ENCOUNTER — TELEPHONE (OUTPATIENT)
Dept: NEUROLOGY | Facility: CLINIC | Age: 24
End: 2019-08-21

## 2019-08-22 ENCOUNTER — LAB VISIT (OUTPATIENT)
Dept: LAB | Facility: HOSPITAL | Age: 24
End: 2019-08-22
Attending: INTERNAL MEDICINE
Payer: COMMERCIAL

## 2019-08-22 ENCOUNTER — OFFICE VISIT (OUTPATIENT)
Dept: FAMILY MEDICINE | Facility: CLINIC | Age: 24
End: 2019-08-22
Payer: COMMERCIAL

## 2019-08-22 VITALS
HEIGHT: 69 IN | OXYGEN SATURATION: 97 % | HEART RATE: 71 BPM | BODY MASS INDEX: 19.62 KG/M2 | WEIGHT: 132.5 LBS | DIASTOLIC BLOOD PRESSURE: 72 MMHG | SYSTOLIC BLOOD PRESSURE: 104 MMHG | TEMPERATURE: 98 F

## 2019-08-22 DIAGNOSIS — G40.909 SEIZURE DISORDER: ICD-10-CM

## 2019-08-22 DIAGNOSIS — R40.0 SOMNOLENCE: ICD-10-CM

## 2019-08-22 DIAGNOSIS — K59.00 CONSTIPATION, UNSPECIFIED CONSTIPATION TYPE: ICD-10-CM

## 2019-08-22 DIAGNOSIS — Z00.00 ROUTINE PHYSICAL EXAMINATION: ICD-10-CM

## 2019-08-22 DIAGNOSIS — Z00.00 ROUTINE PHYSICAL EXAMINATION: Primary | ICD-10-CM

## 2019-08-22 DIAGNOSIS — R82.90 ABNORMAL URINE: ICD-10-CM

## 2019-08-22 LAB
ALBUMIN SERPL BCP-MCNC: 4.5 G/DL (ref 3.5–5.2)
ALP SERPL-CCNC: 82 U/L (ref 55–135)
ALT SERPL W/O P-5'-P-CCNC: 29 U/L (ref 10–44)
ANION GAP SERPL CALC-SCNC: 9 MMOL/L (ref 8–16)
AST SERPL-CCNC: 25 U/L (ref 10–40)
BASOPHILS # BLD AUTO: 0.02 K/UL (ref 0–0.2)
BASOPHILS NFR BLD: 0.5 % (ref 0–1.9)
BILIRUB SERPL-MCNC: 0.9 MG/DL (ref 0.1–1)
BUN SERPL-MCNC: 13 MG/DL (ref 6–20)
CALCIUM SERPL-MCNC: 10.4 MG/DL (ref 8.7–10.5)
CHLORIDE SERPL-SCNC: 101 MMOL/L (ref 95–110)
CO2 SERPL-SCNC: 28 MMOL/L (ref 23–29)
CREAT SERPL-MCNC: 1.1 MG/DL (ref 0.5–1.4)
DIFFERENTIAL METHOD: NORMAL
EOSINOPHIL # BLD AUTO: 0 K/UL (ref 0–0.5)
EOSINOPHIL NFR BLD: 1 % (ref 0–8)
ERYTHROCYTE [DISTWIDTH] IN BLOOD BY AUTOMATED COUNT: 12.6 % (ref 11.5–14.5)
EST. GFR  (AFRICAN AMERICAN): >60 ML/MIN/1.73 M^2
EST. GFR  (NON AFRICAN AMERICAN): >60 ML/MIN/1.73 M^2
GLUCOSE SERPL-MCNC: 83 MG/DL (ref 70–110)
HCT VFR BLD AUTO: 47.4 % (ref 40–54)
HGB BLD-MCNC: 15.3 G/DL (ref 14–18)
IMM GRANULOCYTES # BLD AUTO: 0.01 K/UL (ref 0–0.04)
IMM GRANULOCYTES NFR BLD AUTO: 0.2 % (ref 0–0.5)
LYMPHOCYTES # BLD AUTO: 2 K/UL (ref 1–4.8)
LYMPHOCYTES NFR BLD: 46.4 % (ref 18–48)
MCH RBC QN AUTO: 29.4 PG (ref 27–31)
MCHC RBC AUTO-ENTMCNC: 32.3 G/DL (ref 32–36)
MCV RBC AUTO: 91 FL (ref 82–98)
MONOCYTES # BLD AUTO: 0.3 K/UL (ref 0.3–1)
MONOCYTES NFR BLD: 6.7 % (ref 4–15)
NEUTROPHILS # BLD AUTO: 1.9 K/UL (ref 1.8–7.7)
NEUTROPHILS NFR BLD: 45.2 % (ref 38–73)
NRBC BLD-RTO: 0 /100 WBC
PLATELET # BLD AUTO: 251 K/UL (ref 150–350)
PMV BLD AUTO: 10.4 FL (ref 9.2–12.9)
POTASSIUM SERPL-SCNC: 4.7 MMOL/L (ref 3.5–5.1)
PROT SERPL-MCNC: 7.5 G/DL (ref 6–8.4)
RBC # BLD AUTO: 5.2 M/UL (ref 4.6–6.2)
SODIUM SERPL-SCNC: 138 MMOL/L (ref 136–145)
TSH SERPL DL<=0.005 MIU/L-ACNC: 0.86 UIU/ML (ref 0.4–4)
WBC # BLD AUTO: 4.2 K/UL (ref 3.9–12.7)

## 2019-08-22 PROCEDURE — 99999 PR PBB SHADOW E&M-EST. PATIENT-LVL III: CPT | Mod: PBBFAC,,, | Performed by: INTERNAL MEDICINE

## 2019-08-22 PROCEDURE — 85025 COMPLETE CBC W/AUTO DIFF WBC: CPT

## 2019-08-22 PROCEDURE — 84443 ASSAY THYROID STIM HORMONE: CPT

## 2019-08-22 PROCEDURE — 80177 DRUG SCRN QUAN LEVETIRACETAM: CPT

## 2019-08-22 PROCEDURE — 99999 PR PBB SHADOW E&M-EST. PATIENT-LVL III: ICD-10-PCS | Mod: PBBFAC,,, | Performed by: INTERNAL MEDICINE

## 2019-08-22 PROCEDURE — 99395 PREV VISIT EST AGE 18-39: CPT | Mod: S$GLB,,, | Performed by: INTERNAL MEDICINE

## 2019-08-22 PROCEDURE — 80053 COMPREHEN METABOLIC PANEL: CPT

## 2019-08-22 PROCEDURE — 99395 PR PREVENTIVE VISIT,EST,18-39: ICD-10-PCS | Mod: S$GLB,,, | Performed by: INTERNAL MEDICINE

## 2019-08-22 NOTE — PROGRESS NOTES
Subjective:       Patient ID: Eddie Light is a 24 y.o. male.    Chief Complaint: Annual Exam    Here for routine health maintenance.    Seizure d/o - controlled on Keppra.  Sees Dr Rowell next week  Complains of constipation.  Had hemrrhoid at one point but gone now.  Complains of being very sleepy mostly when he wakes.  Some in afternoon and better at night.  Started 1 yr ago when keppra dose was moved to night (was bid; now 2x at night).  Mood good.  Mother with him - he does not snore.  No LAD, f/c, weight loss.     Review of Systems   Constitutional: Positive for fatigue. Negative for appetite change and fever.   HENT: Negative for nosebleeds and trouble swallowing.    Eyes: Negative for discharge and visual disturbance.   Respiratory: Negative for choking and shortness of breath.    Cardiovascular: Negative for chest pain and palpitations.   Gastrointestinal: Positive for constipation. Negative for abdominal pain, nausea and vomiting.   Musculoskeletal: Negative for arthralgias and joint swelling.   Skin: Negative for rash and wound.   Neurological: Negative for dizziness and syncope.   Psychiatric/Behavioral: Negative for confusion and dysphoric mood.       Objective:      Vitals:    08/22/19 1245   BP: 104/72   Pulse: 71   Temp: 97.6 °F (36.4 °C)     Physical Exam   Constitutional: He appears well-nourished.   Eyes: Conjunctivae and EOM are normal.   Neck: Trachea normal and normal range of motion. No thyromegaly present.   Cardiovascular: Normal heart sounds.   Edema negative   Pulmonary/Chest: Effort normal and breath sounds normal.   Abdominal: Soft. There is no hepatomegaly.   Musculoskeletal:   ROM normal bilateral  Strength normal bilateral   Neurological: He has normal reflexes. No cranial nerve deficit.   Skin: Skin is warm, dry and intact.   Psychiatric: He has a normal mood and affect.   Alert and Oriented    Vitals reviewed.        Assessment:       1. Routine physical examination    2.  Somnolence    3. Abnormal urine    4. Seizure disorder    5. Constipation, unspecified constipation type        Plan:       Routine physical examination  -     CBC auto differential; Future; Expected date: 08/22/2019  -     Comprehensive metabolic panel; Future; Expected date: 08/22/2019  -     TSH; Future; Expected date: 08/22/2019  -     Urinalysis; Future; Expected date: 08/22/2019    Somnolence    Abnormal urine    Seizure disorder  -     Levetiracetam level; Future; Expected date: 08/22/2019    Constipation, unspecified constipation type            Medication List with Changes/Refills   Current Medications    ASPIRIN/ACETAMINOPHEN/CAFFEINE (EXCEDRIN MIGRAINE ORAL)    Take by mouth as needed.    LEVETIRACETAM XR (KEPPRA XR) 500 MG TB24 24 HR TABLET    Take 2 tablets (1,000 mg total) by mouth once daily.    MULTIVITAMIN (ONE DAILY MULTIVITAMIN) PER TABLET    Take 1 tablet by mouth once daily.   Discontinued Medications    FLUCONAZOLE (DIFLUCAN) 200 MG TAB    1 po q week for 12 weeks     Somnolence likely Keppra double dose at night.  Try cup of coffee in am if school.  Will rule out other.   miralax   Continue current management and monitor.    Counseled on regular exercise, maintenance of a healthy weight, balanced diet rich in fruits/vegetables and lean protein, and avoidance of unhealthy habits like smoking and excessive alcohol intake.   Also, counseled on importance of being compliant with medication, health appointments, diet and exercise.     Follow up in about 1 year (around 8/22/2020).

## 2019-08-22 NOTE — PATIENT INSTRUCTIONS
Constipation treatment with over the counter medicines:  Miralax - you can take anywhere from 1/2 a cap to up to 2 cap fulls every night to maintain one bowel movement per day.  Magnesium - you can take 500 mg or Milk of Magnesium.at night in addition to the Miralax if the Miralax does not work.

## 2019-08-26 LAB — LEVETIRACETAM SERPL-MCNC: 11.9 UG/ML (ref 3–60)

## 2019-08-27 ENCOUNTER — TELEPHONE (OUTPATIENT)
Dept: NEUROLOGY | Facility: CLINIC | Age: 24
End: 2019-08-27

## 2019-08-27 NOTE — TELEPHONE ENCOUNTER
Called pt to reschedule EP appt with Dr. Rowell on 10.01.19; spoke with pt dad and rescheduled. appt date/time/location confirmed; verbalized understanding.

## 2019-09-02 ENCOUNTER — PATIENT MESSAGE (OUTPATIENT)
Dept: NEUROLOGY | Facility: CLINIC | Age: 24
End: 2019-09-02

## 2019-09-03 RX ORDER — LEVETIRACETAM 500 MG/1
1000 TABLET, EXTENDED RELEASE ORAL DAILY
Qty: 180 TABLET | Refills: 0 | Status: SHIPPED | OUTPATIENT
Start: 2019-09-03 | End: 2019-12-25 | Stop reason: SDUPTHER

## 2019-10-03 ENCOUNTER — OFFICE VISIT (OUTPATIENT)
Dept: NEUROLOGY | Facility: CLINIC | Age: 24
End: 2019-10-03
Payer: COMMERCIAL

## 2019-10-03 ENCOUNTER — LAB VISIT (OUTPATIENT)
Dept: LAB | Facility: HOSPITAL | Age: 24
End: 2019-10-03
Attending: PSYCHIATRY & NEUROLOGY
Payer: COMMERCIAL

## 2019-10-03 VITALS
HEIGHT: 69 IN | WEIGHT: 131.5 LBS | BODY MASS INDEX: 19.48 KG/M2 | DIASTOLIC BLOOD PRESSURE: 74 MMHG | SYSTOLIC BLOOD PRESSURE: 106 MMHG | RESPIRATION RATE: 20 BRPM | HEART RATE: 94 BPM

## 2019-10-03 DIAGNOSIS — G40.209 LOCALIZATION-RELATED (FOCAL) (PARTIAL) SYMPTOMATIC EPILEPSY AND EPILEPTIC SYNDROMES WITH COMPLEX PARTIAL SEIZURES, NOT INTRACTABLE, WITHOUT STATUS EPILEPTICUS: ICD-10-CM

## 2019-10-03 DIAGNOSIS — R74.8 ELEVATED CK: ICD-10-CM

## 2019-10-03 DIAGNOSIS — R74.8 ELEVATED CK: Primary | ICD-10-CM

## 2019-10-03 LAB — CK SERPL-CCNC: 71 U/L (ref 20–200)

## 2019-10-03 PROCEDURE — 82550 ASSAY OF CK (CPK): CPT

## 2019-10-03 PROCEDURE — 99999 PR PBB SHADOW E&M-EST. PATIENT-LVL III: ICD-10-PCS | Mod: PBBFAC,,, | Performed by: PSYCHIATRY & NEUROLOGY

## 2019-10-03 PROCEDURE — 3008F PR BODY MASS INDEX (BMI) DOCUMENTED: ICD-10-PCS | Mod: CPTII,S$GLB,, | Performed by: PSYCHIATRY & NEUROLOGY

## 2019-10-03 PROCEDURE — 36415 COLL VENOUS BLD VENIPUNCTURE: CPT | Mod: PO

## 2019-10-03 PROCEDURE — 99999 PR PBB SHADOW E&M-EST. PATIENT-LVL III: CPT | Mod: PBBFAC,,, | Performed by: PSYCHIATRY & NEUROLOGY

## 2019-10-03 PROCEDURE — 99214 PR OFFICE/OUTPT VISIT, EST, LEVL IV, 30-39 MIN: ICD-10-PCS | Mod: S$GLB,,, | Performed by: PSYCHIATRY & NEUROLOGY

## 2019-10-03 PROCEDURE — 3008F BODY MASS INDEX DOCD: CPT | Mod: CPTII,S$GLB,, | Performed by: PSYCHIATRY & NEUROLOGY

## 2019-10-03 PROCEDURE — 99214 OFFICE O/P EST MOD 30 MIN: CPT | Mod: S$GLB,,, | Performed by: PSYCHIATRY & NEUROLOGY

## 2019-10-03 RX ORDER — AMOXICILLIN 500 MG
1 CAPSULE ORAL DAILY
COMMUNITY
End: 2022-06-21

## 2019-10-03 NOTE — PROGRESS NOTES
Date of service:  10/3/2019    Chief complaint:  Seizures    Interval history:  The patient is a 24 y.o. male seen previously in for seizures, last in .  Previously, we tried transitioning him from Zonegran to Lamictal.  He apparently had significant drowsiness on this, so we then transitioned to Keppra.  When I saw him in 2017, no side effects were reported.  He returns today reporting no further seizures.  He does report some morning fatigue which improves over the course of the day.  He wonders if this might be related to his Keppra.  He endorses getting at least 8 hours of sleep at night.  He denies interrupted/disrupted sleep.  He has no history of snoring.  He has never had witnessed sleep apnea.    History of present illness:  The patient is a 24 y.o. male referred for evaluation of episodes suspicious for seizures. These began at about age 10-12.  The patient has no consistent warning, though he says that he has seen a red light before one event.  His seizure is characterized by generalized stiffening and shaking.  There is a history of tongue biting.  This component of this spell lasts for approximately 2 minutes.  Afterwards, he reports confusion/fatigue.  The patient's frequency of events is roughly once a year.    The patient has no family history of seizures.  He reports a history of prenatal/ complications with the patient having been born prematurely. There is no history of febrile seizures.  He notes no history of CNS infections. He claims no history of significant head trauma. There is a history of developmental abnormalities on his MRI of the brain as described below.    Current AEDs:  Keppra XR 1000 mg QHS    Prior AEDs:  Phenobarbital (lethargy)  Zonegran (drowsiness)  Lamictal (drowsiness)      Past Medical History:   Diagnosis Date    ADHD (attention deficit hyperactivity disorder)     Headache(784.0)     Nevus     seen by Derm    Otitis media     Seizures 2006    x 1     Short stature for age     Dr. Elena Garcia    Strep throat     Wears glasses     Dr. Lara       Past Surgical History:   Procedure Laterality Date    HERNIA REPAIR      TYMPANOSTOMY TUBE PLACEMENT         Family history:  Family History   Problem Relation Age of Onset    Hypertension Maternal Grandmother     Heart disease Maternal Grandfather     COPD Paternal Grandmother     COPD Paternal Grandfather      No reported FH of seizures      Social history:  Social History     Socioeconomic History    Marital status: Single     Spouse name: Not on file    Number of children: Not on file    Years of education: Not on file    Highest education level: Not on file   Occupational History    Not on file   Social Needs    Financial resource strain: Not hard at all    Food insecurity:     Worry: Never true     Inability: Never true    Transportation needs:     Medical: No     Non-medical: No   Tobacco Use    Smoking status: Never Smoker    Smokeless tobacco: Never Used   Substance and Sexual Activity    Alcohol use: No     Frequency: Never    Drug use: No    Sexual activity: Not on file   Lifestyle    Physical activity:     Days per week: 5 days     Minutes per session: 150+ min    Stress: Not at all   Relationships    Social connections:     Talks on phone: More than three times a week     Gets together: More than three times a week     Attends Rastafari service: Not on file     Active member of club or organization: Yes     Attends meetings of clubs or organizations: Never     Relationship status: Never    Other Topics Concern    Not on file   Social History Narrative    Not on file   No reported T/E/D       Review of Systems  General/Constitutional:  No unintentional weight loss, No change in appetite  Eyes/Vision:  No change in vision, No double vision  ENT:  No frequent nose bleeds, No ringing in the ears  Respiratory:  No cough, No wheezing  Cardiovascular:  No chest pain, No  "palpitations  Gastrointestinal:  No jaundice, No nausea/vomiting  Genitourinary:  No incontinence, No burning with urination  Hematologic/Lymphatic:  No easy bruising/bleeding, No night sweats  Neurological:  No numbness, No weakness  Endocrine:  + fatigue, No heat/cold intolerance  Allergy/Immunologic:  No fevers, No chills  Musculoskeletal:  + muscle pain, No joint pain   Psychiatric:  No thoughts of harming self/others, No depression  Integumentary:  No rashes, No sores that do not heal     Physical exam:  /74   Pulse 94   Resp 20   Ht 5' 9" (1.753 m)   Wt 59.6 kg (131 lb 8.1 oz)   BMI 19.42 kg/m²   General: Well developed, well nourished.  No acute distress.  HEENT: Atraumatic, normocephalic.  Neck: Supple, trachea midline.  Cardiovascular: Regular rate and rhythm.  Pulmonary: No increased work of breathing.  Abdomen/GI: No guarding.  Musculoskeletal: No obvious joint deformities, moves all extremities well.    Neurological exam:   Mental status: Awake and alert.  Oriented x4.  Speech fluent and appropriate.  Recent and remote memory appear to be grossly intact.  Fund of knowledge grossly normal.  Cranial nerves: Pupils equal round and reactive to light, extraocular movements intact, facial strength and sensation intact bilaterally, palate and tongue midline, hearing grossly intact bilaterally.  Motor: 5 out of 5 strength throughout the upper and lower extremities bilaterally. Normal bulk and tone.  Sensation: Intact to light touch and temperature bilaterally.  DTR: 2+ at the knees and biceps bilaterally.  Coordination: Finger-nose-finger testing intact bilaterally.  Gait: Normal gait. Good tandem.    Data base:  Notes of the referring physician were reviewed.  Briefly summarized, these discussed his recent seizure history and indicated that the patient was being transitioned from phenobarbital to Zonegran secondary to lethargy.    Labs:  CMP (11/13/17): includes KVA=909 and ALT=191  CBC (11/13/17): " "normal  CK (3/16/16): 211 (previously 305)    MRI brain (2012):  "1. 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  2. 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 This is not optimally evaluated secondary to motion artifact and metal artifact within the mouth.  3. Dysgenesis of the corpus callosum.  4. Fluid within right mastoid air cells. Please correlate clinically for symptoms of mastoiditis."    EEG (11/15):  "Normal EEG"    Assessment and plan:  The patient is a 24 y.o. male referred for evaluation for seizures. 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  As far as medications go, we will continue him on Keppra XR, though we will have him try taking it 500 mg to see if this has any impact on his drowsiness. Medication side effects were discussed with the patient.  Serologies are pending for medication monitoring purposes.  State law as it pertains to driving for individuals with seizures was discussed.  The patient was also counseled on seizure safety.    He has a history of elevated LFTs and history of mildly elevated CKs.  Evaluation for this did not reveal any worrisome etiology, and they were told it was likely related to a viral illness.  We will repeat labs to make certain this remains normal.    We will plan on seeing the patient back in a few months.  "

## 2019-10-04 ENCOUNTER — IMMUNIZATION (OUTPATIENT)
Dept: PHARMACY | Facility: CLINIC | Age: 24
End: 2019-10-04
Payer: COMMERCIAL

## 2019-10-04 ENCOUNTER — TELEPHONE (OUTPATIENT)
Dept: NEUROLOGY | Facility: CLINIC | Age: 24
End: 2019-10-04

## 2019-10-09 PROBLEM — G40.209 LOCALIZATION-RELATED (FOCAL) (PARTIAL) SYMPTOMATIC EPILEPSY AND EPILEPTIC SYNDROMES WITH COMPLEX PARTIAL SEIZURES, NOT INTRACTABLE, WITHOUT STATUS EPILEPTICUS: Status: ACTIVE | Noted: 2019-10-09

## 2019-11-18 ENCOUNTER — TELEPHONE (OUTPATIENT)
Dept: NEUROLOGY | Facility: CLINIC | Age: 24
End: 2019-11-18

## 2019-12-05 ENCOUNTER — TELEPHONE (OUTPATIENT)
Dept: NEUROLOGY | Facility: CLINIC | Age: 24
End: 2019-12-05

## 2019-12-26 RX ORDER — LEVETIRACETAM 500 MG/1
1000 TABLET, EXTENDED RELEASE ORAL DAILY
Qty: 180 TABLET | Refills: 0 | Status: SHIPPED | OUTPATIENT
Start: 2019-12-26 | End: 2020-03-26 | Stop reason: SDUPTHER

## 2020-03-26 RX ORDER — LEVETIRACETAM 500 MG/1
1000 TABLET, EXTENDED RELEASE ORAL DAILY
Qty: 180 TABLET | Refills: 0 | Status: CANCELLED | OUTPATIENT
Start: 2020-03-26 | End: 2021-03-26

## 2020-03-27 RX ORDER — LEVETIRACETAM 500 MG/1
1000 TABLET, EXTENDED RELEASE ORAL DAILY
Qty: 180 TABLET | Refills: 0 | Status: SHIPPED | OUTPATIENT
Start: 2020-03-27 | End: 2020-06-04 | Stop reason: SDUPTHER

## 2020-05-28 ENCOUNTER — PATIENT MESSAGE (OUTPATIENT)
Dept: NEUROLOGY | Facility: CLINIC | Age: 25
End: 2020-05-28

## 2020-05-28 ENCOUNTER — TELEPHONE (OUTPATIENT)
Dept: NEUROLOGY | Facility: CLINIC | Age: 25
End: 2020-05-28

## 2020-05-28 NOTE — TELEPHONE ENCOUNTER
Returned call to pt's mom about pt having a sz; pt mom stated that pt has not missed any meds; pt does sleep a lot; he sleeps about 9 hours per night; mom stated that he does not sleep during the day, but will lay around the house if he does not have to work his energy leave is very low; stress level was high the past 6 months with work and school, but mom stated that the last week or so stress has been at a low; pt has had no illness, but he does complain of headaches a lot and he does not have a very good appetite since he has been on the keppra; mom states he can go hours without eating.

## 2020-05-28 NOTE — TELEPHONE ENCOUNTER
----- Message from Joanie Voss sent at 5/28/2020 10:54 AM CDT -----  Contact: Moon  Type:  Patient Returning Call    Who Called:  Moon  Who Left Message for Patient:  Barbie  Does the patient know what this is regarding?:  An appt  Best Call Back Number:  [phone]  Additional Information:  Pt had a seizure

## 2020-06-01 ENCOUNTER — PATIENT MESSAGE (OUTPATIENT)
Dept: NEUROLOGY | Facility: CLINIC | Age: 25
End: 2020-06-01

## 2020-06-02 ENCOUNTER — TELEPHONE (OUTPATIENT)
Dept: NEUROLOGY | Facility: CLINIC | Age: 25
End: 2020-06-02

## 2020-06-02 NOTE — TELEPHONE ENCOUNTER
Spoke with the pt's mom, they will keep the appt on Thursday.  She has questions about schooling, video games, seizure precautions, where his seizure are coming from. Went over seizure precautions with her.

## 2020-06-04 ENCOUNTER — OFFICE VISIT (OUTPATIENT)
Dept: NEUROLOGY | Facility: CLINIC | Age: 25
End: 2020-06-04
Payer: COMMERCIAL

## 2020-06-04 VITALS
SYSTOLIC BLOOD PRESSURE: 111 MMHG | RESPIRATION RATE: 18 BRPM | HEIGHT: 69 IN | BODY MASS INDEX: 19.02 KG/M2 | DIASTOLIC BLOOD PRESSURE: 77 MMHG | HEART RATE: 87 BPM | TEMPERATURE: 99 F | WEIGHT: 128.44 LBS

## 2020-06-04 DIAGNOSIS — G40.209 LOCALIZATION-RELATED (FOCAL) (PARTIAL) SYMPTOMATIC EPILEPSY AND EPILEPTIC SYNDROMES WITH COMPLEX PARTIAL SEIZURES, NOT INTRACTABLE, WITHOUT STATUS EPILEPTICUS: Primary | ICD-10-CM

## 2020-06-04 PROCEDURE — 99999 PR PBB SHADOW E&M-EST. PATIENT-LVL III: ICD-10-PCS | Mod: PBBFAC,,, | Performed by: PSYCHIATRY & NEUROLOGY

## 2020-06-04 PROCEDURE — 99214 PR OFFICE/OUTPT VISIT, EST, LEVL IV, 30-39 MIN: ICD-10-PCS | Mod: S$GLB,,, | Performed by: PSYCHIATRY & NEUROLOGY

## 2020-06-04 PROCEDURE — 3008F PR BODY MASS INDEX (BMI) DOCUMENTED: ICD-10-PCS | Mod: CPTII,S$GLB,, | Performed by: PSYCHIATRY & NEUROLOGY

## 2020-06-04 PROCEDURE — 99999 PR PBB SHADOW E&M-EST. PATIENT-LVL III: CPT | Mod: PBBFAC,,, | Performed by: PSYCHIATRY & NEUROLOGY

## 2020-06-04 PROCEDURE — 3008F BODY MASS INDEX DOCD: CPT | Mod: CPTII,S$GLB,, | Performed by: PSYCHIATRY & NEUROLOGY

## 2020-06-04 PROCEDURE — 99214 OFFICE O/P EST MOD 30 MIN: CPT | Mod: S$GLB,,, | Performed by: PSYCHIATRY & NEUROLOGY

## 2020-06-04 RX ORDER — LEVETIRACETAM 500 MG/1
1500 TABLET, EXTENDED RELEASE ORAL DAILY
Qty: 90 TABLET | Refills: 11 | Status: SHIPPED | OUTPATIENT
Start: 2020-06-04 | End: 2021-04-10 | Stop reason: SDUPTHER

## 2020-06-04 NOTE — PROGRESS NOTES
Date of service:  2020    Chief complaint:  Seizures    Interval history:  The patient is a 24 y.o. male seen previously for epilepsy.  I last saw him in 10/19.  Since he was last here, he reports 1 interval seizure, which occurred on 20.  He denies missed doses of medication, sleep disruption, illness, and so forth around this time.  He had been taking his Keppra  mg BID.  He reports that he was more drowsy during the day on this regimen, so they went back to 1000 mg QHS.    Prior history:  The patient is a 24 y.o. male seen previously in for seizures, last in .  Previously, we tried transitioning him from Zonegran to Lamictal.  He apparently had significant drowsiness on this, so we then transitioned to Keppra.  When I saw him in , no side effects were reported.  He returns today reporting no further seizures.  He does report some morning fatigue which improves over the course of the day.  He wonders if this might be related to his Keppra.  He endorses getting at least 8 hours of sleep at night.  He denies interrupted/disrupted sleep.  He has no history of snoring.  He has never had witnessed sleep apnea.    History of present illness:  The patient is a 24 y.o. male referred for evaluation of episodes suspicious for seizures. These began at about age 10-12.  The patient has no consistent warning, though he says that he has seen a red light before one event.  His seizure is characterized by generalized stiffening and shaking.  There is a history of tongue biting.  This component of this spell lasts for approximately 2 minutes.  Afterwards, he reports confusion/fatigue.  The patient's frequency of events is roughly once a year.    The patient has no family history of seizures.  He reports a history of prenatal/ complications with the patient having been born prematurely. There is no history of febrile seizures.  He notes no history of CNS infections. He claims no history of  significant head trauma. There is a history of developmental abnormalities on his MRI of the brain as described below.    Current AEDs:  Keppra XR 1000 mg QHSD    Prior AEDs:  Phenobarbital (lethargy)  Zonegran (drowsiness)  Lamictal (drowsiness)      Past Medical History:   Diagnosis Date    ADHD (attention deficit hyperactivity disorder)     Headache(784.0)     Nevus     seen by Derm    Otitis media     Seizures 2006    x 1    Short stature for age     Dr. Elena Garcia    Strep throat     Wears glasses     Dr. Lara       Past Surgical History:   Procedure Laterality Date    HERNIA REPAIR      TYMPANOSTOMY TUBE PLACEMENT         Family history:  Family History   Problem Relation Age of Onset    Hypertension Maternal Grandmother     Heart disease Maternal Grandfather     COPD Paternal Grandmother     COPD Paternal Grandfather      No reported FH of seizures      Social history:  Social History     Socioeconomic History    Marital status: Single     Spouse name: Not on file    Number of children: Not on file    Years of education: Not on file    Highest education level: Not on file   Occupational History    Not on file   Social Needs    Financial resource strain: Not hard at all    Food insecurity:     Worry: Never true     Inability: Never true    Transportation needs:     Medical: No     Non-medical: No   Tobacco Use    Smoking status: Never Smoker    Smokeless tobacco: Never Used   Substance and Sexual Activity    Alcohol use: No     Frequency: Never    Drug use: No    Sexual activity: Not on file   Lifestyle    Physical activity:     Days per week: 5 days     Minutes per session: 150+ min    Stress: Not at all   Relationships    Social connections:     Talks on phone: More than three times a week     Gets together: More than three times a week     Attends Religion service: Not on file     Active member of club or organization: Yes     Attends meetings of clubs or  "organizations: Never     Relationship status: Never    Other Topics Concern    Not on file   Social History Narrative    Not on file   No reported T/E/D       Review of Systems  General/Constitutional:  No unintentional weight loss, No change in appetite  Eyes/Vision:  No change in vision, No double vision  ENT:  No frequent nose bleeds, No ringing in the ears  Respiratory:  No cough, No wheezing  Cardiovascular:  No chest pain, No palpitations  Gastrointestinal:  No jaundice, No nausea/vomiting  Genitourinary:  No incontinence, No burning with urination  Hematologic/Lymphatic:  No easy bruising/bleeding, No night sweats  Neurological:  No numbness, No weakness  Endocrine:  + fatigue, No heat/cold intolerance  Allergy/Immunologic:  No fevers, No chills  Musculoskeletal:  + muscle pain, No joint pain   Psychiatric:  No thoughts of harming self/others, No depression  Integumentary:  No rashes, No sores that do not heal     Physical exam:  /77   Pulse 87   Temp 98.7 °F (37.1 °C)   Resp 18   Ht 5' 9" (1.753 m)   Wt 58.3 kg (128 lb 6.7 oz)   BMI 18.96 kg/m²   General: Well developed, well nourished.  No acute distress.  HEENT: Atraumatic, normocephalic.  Neck: Supple, trachea midline.  Cardiovascular: Regular rate and rhythm.  Pulmonary: No increased work of breathing.  Abdomen/GI: No guarding.  Musculoskeletal: No obvious joint deformities, moves all extremities well.    Neurological exam:   Mental status: Awake and alert.  Oriented x4.  Speech fluent and appropriate.  Recent and remote memory appear to be grossly intact.  Fund of knowledge grossly normal.  Cranial nerves: Pupils equal round and reactive to light, extraocular movements intact, facial strength and sensation intact bilaterally, palate and tongue midline, hearing grossly intact bilaterally.  Motor: 5 out of 5 strength throughout the upper and lower extremities bilaterally. Normal bulk and tone.  Sensation: Intact to light touch and " "temperature bilaterally.  DTR: 2+ at the knees and biceps bilaterally.  Coordination: Finger-nose-finger testing intact bilaterally.  Gait: Normal gait. Good tandem.    Data base:  Notes of the referring physician were reviewed.  Briefly summarized, these discussed his recent seizure history and indicated that the patient was being transitioned from phenobarbital to Zonegran secondary to lethargy.    Labs:  Labs checked at our last visit include a normal CMP, CBC, and CK.  His LEV level was 11.9.    MRI brain (2012):  "1. 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  2. 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 This is not optimally evaluated secondary to motion artifact and metal artifact within the mouth.  3. Dysgenesis of the corpus callosum.  4. Fluid within right mastoid air cells. Please correlate clinically for symptoms of mastoiditis."    EEG (11/15):  "Normal EEG"    Assessment and plan:  The patient is a 24 y.o. male referred for evaluation for seizures. 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  As far as medications go, we will increase his Keppra XR to 1500 mg daily. Medication side effects were discussed with the patient.  If he has side effects, we will go back to 1000 mg daily and add a different AED.  Serologies are pending for medication monitoring purposes.  State law as it pertains to driving for individuals with seizures has been discussed.  The patient has also been counseled on seizure safety.  We will plan on seeing the patient back in a few months.    "

## 2020-10-05 ENCOUNTER — PATIENT MESSAGE (OUTPATIENT)
Dept: ADMINISTRATIVE | Facility: HOSPITAL | Age: 25
End: 2020-10-05

## 2020-10-06 ENCOUNTER — IMMUNIZATION (OUTPATIENT)
Dept: PHARMACY | Facility: CLINIC | Age: 25
End: 2020-10-06
Payer: COMMERCIAL

## 2020-10-23 ENCOUNTER — OFFICE VISIT (OUTPATIENT)
Dept: FAMILY MEDICINE | Facility: CLINIC | Age: 25
End: 2020-10-23
Payer: COMMERCIAL

## 2020-10-23 ENCOUNTER — LAB VISIT (OUTPATIENT)
Dept: LAB | Facility: HOSPITAL | Age: 25
End: 2020-10-23
Attending: FAMILY MEDICINE
Payer: COMMERCIAL

## 2020-10-23 VITALS
TEMPERATURE: 98 F | HEART RATE: 72 BPM | RESPIRATION RATE: 14 BRPM | WEIGHT: 128.5 LBS | HEIGHT: 69 IN | SYSTOLIC BLOOD PRESSURE: 106 MMHG | BODY MASS INDEX: 19.03 KG/M2 | DIASTOLIC BLOOD PRESSURE: 72 MMHG

## 2020-10-23 DIAGNOSIS — R51.9 NEW ONSET HEADACHE: ICD-10-CM

## 2020-10-23 DIAGNOSIS — G40.209 LOCALIZATION-RELATED (FOCAL) (PARTIAL) SYMPTOMATIC EPILEPSY AND EPILEPTIC SYNDROMES WITH COMPLEX PARTIAL SEIZURES, NOT INTRACTABLE, WITHOUT STATUS EPILEPTICUS: ICD-10-CM

## 2020-10-23 DIAGNOSIS — Z00.00 GENERAL MEDICAL EXAM: ICD-10-CM

## 2020-10-23 DIAGNOSIS — R53.83 FATIGUE, UNSPECIFIED TYPE: ICD-10-CM

## 2020-10-23 DIAGNOSIS — B35.1 ONYCHOMYCOSIS: ICD-10-CM

## 2020-10-23 DIAGNOSIS — Z23 IMMUNIZATION DUE: ICD-10-CM

## 2020-10-23 DIAGNOSIS — Z00.00 GENERAL MEDICAL EXAM: Primary | ICD-10-CM

## 2020-10-23 LAB
ALBUMIN SERPL BCP-MCNC: 4.5 G/DL (ref 3.5–5.2)
ALP SERPL-CCNC: 73 U/L (ref 55–135)
ALT SERPL W/O P-5'-P-CCNC: 30 U/L (ref 10–44)
ANION GAP SERPL CALC-SCNC: 7 MMOL/L (ref 8–16)
AST SERPL-CCNC: 27 U/L (ref 10–40)
BASOPHILS # BLD AUTO: 0.03 K/UL (ref 0–0.2)
BASOPHILS NFR BLD: 0.7 % (ref 0–1.9)
BILIRUB SERPL-MCNC: 0.9 MG/DL (ref 0.1–1)
BUN SERPL-MCNC: 16 MG/DL (ref 6–20)
CALCIUM SERPL-MCNC: 9.8 MG/DL (ref 8.7–10.5)
CHLORIDE SERPL-SCNC: 103 MMOL/L (ref 95–110)
CHOLEST SERPL-MCNC: 157 MG/DL (ref 120–199)
CHOLEST/HDLC SERPL: 3 {RATIO} (ref 2–5)
CO2 SERPL-SCNC: 30 MMOL/L (ref 23–29)
CREAT SERPL-MCNC: 1 MG/DL (ref 0.5–1.4)
CRP SERPL-MCNC: 0.3 MG/L (ref 0–8.2)
DIFFERENTIAL METHOD: NORMAL
EOSINOPHIL # BLD AUTO: 0.1 K/UL (ref 0–0.5)
EOSINOPHIL NFR BLD: 1.6 % (ref 0–8)
ERYTHROCYTE [DISTWIDTH] IN BLOOD BY AUTOMATED COUNT: 12.2 % (ref 11.5–14.5)
ERYTHROCYTE [SEDIMENTATION RATE] IN BLOOD BY WESTERGREN METHOD: <2 MM/HR (ref 0–23)
EST. GFR  (AFRICAN AMERICAN): >60 ML/MIN/1.73 M^2
EST. GFR  (NON AFRICAN AMERICAN): >60 ML/MIN/1.73 M^2
GLUCOSE SERPL-MCNC: 83 MG/DL (ref 70–110)
HCT VFR BLD AUTO: 47 % (ref 40–54)
HDLC SERPL-MCNC: 53 MG/DL (ref 40–75)
HDLC SERPL: 33.8 % (ref 20–50)
HGB BLD-MCNC: 15.2 G/DL (ref 14–18)
IMM GRANULOCYTES # BLD AUTO: 0.01 K/UL (ref 0–0.04)
IMM GRANULOCYTES NFR BLD AUTO: 0.2 % (ref 0–0.5)
LDLC SERPL CALC-MCNC: 90.8 MG/DL (ref 63–159)
LYMPHOCYTES # BLD AUTO: 2.1 K/UL (ref 1–4.8)
LYMPHOCYTES NFR BLD: 47.9 % (ref 18–48)
MCH RBC QN AUTO: 29.4 PG (ref 27–31)
MCHC RBC AUTO-ENTMCNC: 32.3 G/DL (ref 32–36)
MCV RBC AUTO: 91 FL (ref 82–98)
MONOCYTES # BLD AUTO: 0.4 K/UL (ref 0.3–1)
MONOCYTES NFR BLD: 9.5 % (ref 4–15)
NEUTROPHILS # BLD AUTO: 1.8 K/UL (ref 1.8–7.7)
NEUTROPHILS NFR BLD: 40.1 % (ref 38–73)
NONHDLC SERPL-MCNC: 104 MG/DL
NRBC BLD-RTO: 0 /100 WBC
PLATELET # BLD AUTO: 229 K/UL (ref 150–350)
PMV BLD AUTO: 11 FL (ref 9.2–12.9)
POTASSIUM SERPL-SCNC: 4.3 MMOL/L (ref 3.5–5.1)
PROT SERPL-MCNC: 7.5 G/DL (ref 6–8.4)
RBC # BLD AUTO: 5.17 M/UL (ref 4.6–6.2)
SODIUM SERPL-SCNC: 140 MMOL/L (ref 136–145)
T3FREE SERPL-MCNC: 3.1 PG/ML (ref 2.3–4.2)
T4 FREE SERPL-MCNC: 1.36 NG/DL (ref 0.71–1.51)
TRIGL SERPL-MCNC: 66 MG/DL (ref 30–150)
TSH SERPL DL<=0.005 MIU/L-ACNC: 0.56 UIU/ML (ref 0.4–4)
WBC # BLD AUTO: 4.43 K/UL (ref 3.9–12.7)

## 2020-10-23 PROCEDURE — 86140 C-REACTIVE PROTEIN: CPT

## 2020-10-23 PROCEDURE — 90471 TDAP VACCINE GREATER THAN OR EQUAL TO 7YO IM: ICD-10-PCS | Mod: S$GLB,,, | Performed by: FAMILY MEDICINE

## 2020-10-23 PROCEDURE — 80053 COMPREHEN METABOLIC PANEL: CPT

## 2020-10-23 PROCEDURE — 90472 HPV VACCINE 9-VALENT 3 DOSE IM: ICD-10-PCS | Mod: S$GLB,,, | Performed by: FAMILY MEDICINE

## 2020-10-23 PROCEDURE — 86703 HIV-1/HIV-2 1 RESULT ANTBDY: CPT

## 2020-10-23 PROCEDURE — 99999 PR PBB SHADOW E&M-EST. PATIENT-LVL IV: CPT | Mod: PBBFAC,,, | Performed by: FAMILY MEDICINE

## 2020-10-23 PROCEDURE — 90715 TDAP VACCINE 7 YRS/> IM: CPT | Mod: S$GLB,,, | Performed by: FAMILY MEDICINE

## 2020-10-23 PROCEDURE — 86039 ANTINUCLEAR ANTIBODIES (ANA): CPT

## 2020-10-23 PROCEDURE — 84443 ASSAY THYROID STIM HORMONE: CPT

## 2020-10-23 PROCEDURE — 90651 HPV VACCINE 9-VALENT 3 DOSE IM: ICD-10-PCS | Mod: S$GLB,,, | Performed by: FAMILY MEDICINE

## 2020-10-23 PROCEDURE — 90651 9VHPV VACCINE 2/3 DOSE IM: CPT | Mod: S$GLB,,, | Performed by: FAMILY MEDICINE

## 2020-10-23 PROCEDURE — 80061 LIPID PANEL: CPT

## 2020-10-23 PROCEDURE — 86038 ANTINUCLEAR ANTIBODIES: CPT

## 2020-10-23 PROCEDURE — 90471 IMMUNIZATION ADMIN: CPT | Mod: S$GLB,,, | Performed by: FAMILY MEDICINE

## 2020-10-23 PROCEDURE — 86235 NUCLEAR ANTIGEN ANTIBODY: CPT | Mod: 59

## 2020-10-23 PROCEDURE — 85025 COMPLETE CBC W/AUTO DIFF WBC: CPT

## 2020-10-23 PROCEDURE — 99395 PR PREVENTIVE VISIT,EST,18-39: ICD-10-PCS | Mod: 25,S$GLB,, | Performed by: FAMILY MEDICINE

## 2020-10-23 PROCEDURE — 82607 VITAMIN B-12: CPT

## 2020-10-23 PROCEDURE — 84481 FREE ASSAY (FT-3): CPT

## 2020-10-23 PROCEDURE — 90472 IMMUNIZATION ADMIN EACH ADD: CPT | Mod: S$GLB,,, | Performed by: FAMILY MEDICINE

## 2020-10-23 PROCEDURE — 83036 HEMOGLOBIN GLYCOSYLATED A1C: CPT

## 2020-10-23 PROCEDURE — 99999 PR PBB SHADOW E&M-EST. PATIENT-LVL IV: ICD-10-PCS | Mod: PBBFAC,,, | Performed by: FAMILY MEDICINE

## 2020-10-23 PROCEDURE — 99395 PREV VISIT EST AGE 18-39: CPT | Mod: 25,S$GLB,, | Performed by: FAMILY MEDICINE

## 2020-10-23 PROCEDURE — 36415 COLL VENOUS BLD VENIPUNCTURE: CPT | Mod: PN

## 2020-10-23 PROCEDURE — 84439 ASSAY OF FREE THYROXINE: CPT

## 2020-10-23 PROCEDURE — 90715 TDAP VACCINE GREATER THAN OR EQUAL TO 7YO IM: ICD-10-PCS | Mod: S$GLB,,, | Performed by: FAMILY MEDICINE

## 2020-10-23 PROCEDURE — 85652 RBC SED RATE AUTOMATED: CPT

## 2020-10-23 RX ORDER — FOLIC ACID/MULTIVIT,IRON,MINER 0.4MG-18MG
1 TABLET ORAL DAILY
COMMUNITY

## 2020-10-23 RX ORDER — TERBINAFINE HYDROCHLORIDE 250 MG/1
250 TABLET ORAL DAILY
Qty: 30 TABLET | Refills: 0 | Status: SHIPPED | OUTPATIENT
Start: 2020-10-23 | End: 2020-11-26

## 2020-10-23 NOTE — PROGRESS NOTES
THIS DOCUMENT WAS MADE IN PART WITH VOICE RECOGNITION SOFTWARE.  OCCASIONALLY THIS SOFTWARE WILL MISINTERPRET WORDS OR PHRASES.    Assessment and Plan:    1. General medical exam  - Comprehensive Metabolic Panel; Future  - Lipid Panel; Future  - HIV 1/2 Ag/Ab (4th Gen); Future  - CBC auto differential; Future  - Urinalysis, Reflex to Urine Culture Urine, Clean Catch; Future    2. Immunization due  - (In Office Administered) Tdap Vaccine  - (In Office Administered) HPV Vaccine (9-Valent) (3 Dose) (IM)    3. 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 New onset headache  - MRI Brain Without Contrast; Future    5. Onychomycosis  - terbinafine HCL (LAMISIL) 250 mg tablet; Take 1 tablet (250 mg total) by mouth once daily.  Dispense: 30 tablet; Refill: 0    6. Fatigue, unspecified ty  - Hemoglobin A1C; Future  - TSH; Future  - T4, Free; Future  - T3, Free; Future  - Vitamin B12; Future  - SONY Screen w/Reflex; Future  - Sedimentation rate; Future  - C-Reactive Protein; Future        ______________________________________________________________________  Subjective:    Chief Complaint:  Chief Complaint   Patient presents with    Headache     pt sees neurology. Upcoming appt in nov. wake up with migraine and fatigue. front and left side/left eye        HPI:  Eddie is a 25 y.o. year old     History of seizure, epilepsy  Neurology-Dr. Rowell  Current Rx-Keppra, compliant  Prior AEDs:  Phenobarbital (lethargy)  Zonegran (drowsiness)  Lamictal (drowsiness)  No recent episodes since visit with neuro    Concerns today-  New headache  Typically wakes up in the morning with a migraine type headache and fatigue  HA happens about 2  time  Per week   Location front and left side of head, behind left eye  Occurs most morning  Waking up tired every morning   Duration : 2 months   Sensitive to light, not sound  Resolves with Excedrin   No change in vision         Past Medical History:  Past Medical History:   Diagnosis Date    ADHD (attention deficit hyperactivity disorder)     Headache(784.0)     Nevus     seen by Derm    Otitis media     Seizures 2006    x 1    Short stature for age     Dr. Elena Garcia    Strep throat     Wears glasses     Dr. Lara       Past Surgical History:  Past Surgical History:   Procedure Laterality Date    HERNIA REPAIR      TYMPANOSTOMY TUBE PLACEMENT         Family History:  Family History   Problem Relation Age of Onset    Hypertension Maternal Grandmother     Cancer Maternal Grandmother     Heart disease Maternal Grandfather     COPD Paternal Grandmother     Hypertension Paternal Grandmother     COPD Paternal Grandfather     Hypertension Paternal Grandfather        Social History:  Social History     Socioeconomic History    Marital status: Single     Spouse name: Not on file    Number of children: Not on file    Years of education: Not on file    Highest education level: Not on file   Occupational History    Not on file   Social Needs    Financial resource strain: Not hard at all    Food insecurity     Worry: Never true     Inability: Never true    Transportation needs     Medical: No     Non-medical: No   Tobacco Use    Smoking status: Never Smoker    Smokeless tobacco: Never Used   Substance and Sexual Activity    Alcohol use: Not Currently     Alcohol/week: 1.0 standard drinks     Types: 1 Standard drinks or equivalent per week     Frequency: Never     Comment: very rare    Drug use: No    Sexual activity: Not Currently     Partners: Female   Lifestyle    Physical activity     Days per week: 5 days     Minutes per session: 150+ min    Stress: Not at all   Relationships     Social connections     Talks on phone: More than three times a week     Gets together: More than three times a week     Attends Spiritism service: Not on file     Active member of club or organization: Yes     Attends meetings of clubs or organizations: Never     Relationship status: Never    Other Topics Concern    Not on file   Social History Narrative    Not on file       Medications:  Current Outpatient Medications on File Prior to Visit   Medication Sig Dispense Refill    ASPIRIN/ACETAMINOPHEN/CAFFEINE (EXCEDRIN MIGRAINE ORAL) Take by mouth as needed.      cholecalciferol, vitamin D3, (VITAMIN D3) 10 mcg (400 unit) Chew Take 1 capsule by mouth once daily.      levetiracetam XR (KEPPRA XR) 500 mg Tb24 24 hr tablet Take 3 tablets (1,500 mg total) by mouth once daily. 90 tablet 11    multivitamin (ONE DAILY MULTIVITAMIN) per tablet Take 1 tablet by mouth once daily.      fish oil-omega-3 fatty acids 300-1,000 mg capsule Take 1 capsule by mouth once daily.      [DISCONTINUED] dexmethylphenidate (FOCALIN XR) 30 mg 24 hr capsule Take 30 mg by mouth once daily.       No current facility-administered medications on file prior to visit.        Allergies:  Patient has no known allergies.    Immunizations:  Immunization History   Administered Date(s) Administered    DTaP 1997, 01/16/1998, 04/07/1998, 10/09/1998, 10/02/2003    HIB 1997, 01/16/1998, 04/07/1998, 10/09/1998    HPV 9-Valent 10/23/2020    Hepatitis B, Pediatric/Adolescent 1997, 1997, 1997, 03/23/2009    IPV 1997, 01/16/1998, 04/07/1998, 10/09/1998, 10/02/2003    Influenza 12/16/2002, 11/25/2006, 11/28/2007    Influenza (Flumist) - Quadrivalent - Intranasal *Preferred* (2-49 years old) 10/18/2013, 12/18/2014, 12/17/2015    Influenza - Intranasal 10/23/2012, 10/18/2013    Influenza - Intranasal - Trivalent 10/21/2010, 09/14/2011, 10/23/2012    Influenza - Quadrivalent - PF *Preferred* (6 months and  "older) 11/15/2017, 10/05/2018, 10/04/2019, 10/06/2020    MMR 06/30/1998, 10/02/2003    Meningococcal Conjugate (MCV4P) 03/17/2009    Tdap 03/23/2009, 10/23/2020    Varicella 06/30/1998, 03/17/2009       Review of Systems:  Review of Systems   All other systems reviewed and are negative.      Objective:    Vitals:  Vitals:    10/23/20 1215   BP: 106/72   Pulse: 72   Resp: 14   Temp: 98.1 °F (36.7 °C)   TempSrc: Temporal   Weight: 58.3 kg (128 lb 8.5 oz)   Height: 5' 9" (1.753 m)   PainSc: 0-No pain       Physical Exam  Vitals signs reviewed.   Constitutional:       General: He is not in acute distress.  HENT:      Head: Normocephalic and atraumatic.   Eyes:      Pupils: Pupils are equal, round, and reactive to light.   Neck:      Musculoskeletal: Neck supple.   Cardiovascular:      Rate and Rhythm: Normal rate and regular rhythm.      Heart sounds: No murmur. No friction rub.   Pulmonary:      Effort: Pulmonary effort is normal.      Breath sounds: Normal breath sounds.   Abdominal:      General: Bowel sounds are normal. There is no distension.      Palpations: Abdomen is soft.      Tenderness: There is no abdominal tenderness.   Skin:     General: Skin is warm and dry.      Findings: No rash.   Psychiatric:         Behavior: Behavior normal.         Data:  No previous labs, imaging, or notes available.        Calixto Reyes MD  Family Medicine      "

## 2020-10-24 LAB
ESTIMATED AVG GLUCOSE: 103 MG/DL (ref 68–131)
HBA1C MFR BLD HPLC: 5.2 % (ref 4–5.6)
VIT B12 SERPL-MCNC: 690 PG/ML (ref 210–950)

## 2020-10-26 LAB
ANA PATTERN 1: NORMAL
ANA SER QL IF: POSITIVE
ANA TITR SER IF: NORMAL {TITER}
HIV 1+2 AB+HIV1 P24 AG SERPL QL IA: NEGATIVE

## 2020-10-27 ENCOUNTER — PATIENT MESSAGE (OUTPATIENT)
Dept: FAMILY MEDICINE | Facility: CLINIC | Age: 25
End: 2020-10-27

## 2020-10-27 DIAGNOSIS — R53.83 FATIGUE, UNSPECIFIED TYPE: ICD-10-CM

## 2020-10-27 DIAGNOSIS — R76.8 POSITIVE ANA (ANTINUCLEAR ANTIBODY): Primary | ICD-10-CM

## 2020-10-27 NOTE — TELEPHONE ENCOUNTER
Please call patient about labs, let them know that the SONY came back positive, sometimes this can be specific for autoimmune diseases, sometimes it means nothing.  I would like him to be seen by rheumatology for further evaluation.  Order placed.  Please help him schedule  Dr. Reyes

## 2020-10-29 ENCOUNTER — TELEPHONE (OUTPATIENT)
Dept: RHEUMATOLOGY | Facility: CLINIC | Age: 25
End: 2020-10-29

## 2020-10-29 ENCOUNTER — TELEPHONE (OUTPATIENT)
Dept: PRIMARY CARE CLINIC | Facility: CLINIC | Age: 25
End: 2020-10-29

## 2020-10-29 LAB
ANTI SM ANTIBODY: 0.08 RATIO (ref 0–0.99)
ANTI SM/RNP ANTIBODY: 0.06 RATIO (ref 0–0.99)
ANTI-SM INTERPRETATION: NEGATIVE
ANTI-SM/RNP INTERPRETATION: NEGATIVE
ANTI-SSA ANTIBODY: 0.07 RATIO (ref 0–0.99)
ANTI-SSA INTERPRETATION: NEGATIVE
ANTI-SSB ANTIBODY: 0.06 RATIO (ref 0–0.99)
ANTI-SSB INTERPRETATION: NEGATIVE
DSDNA AB SER-ACNC: NORMAL [IU]/ML

## 2020-10-29 NOTE — TELEPHONE ENCOUNTER
Spoke with pt/pts mother aware of results, attempted to make appt with Rhemuotology, no appts shown  Denied appt outside of [place]       Please call pt to schedule a new pt appt for Positive SONY,

## 2020-10-29 NOTE — TELEPHONE ENCOUNTER
----- Message from Pinky Alejandro, Patient Care Assistant sent at 10/29/2020  8:58 AM CDT -----  Regarding: results  Contact: [facility] patient mother  Type:  Test Results    Who Called:  [facility] patient mother  Name of Test (Lab/Mammo/Etc):  blood work  Date of Test:  10/23/2020  Ordering Provider:  Dr. Reyes   Where the test was performed:  [email or web address]  Best Call Back Number:  [phone]  Additional Information:  please mother to advice. Thanks!

## 2020-10-29 NOTE — TELEPHONE ENCOUNTER
Spoke with patients mother and she declined  and 's first available appointments, gave number for main campus

## 2020-10-29 NOTE — TELEPHONE ENCOUNTER
Called mother and informed Dr Veliz panel is full at this point but will write name down for any cancellations

## 2020-10-30 ENCOUNTER — LAB VISIT (OUTPATIENT)
Dept: LAB | Facility: HOSPITAL | Age: 25
End: 2020-10-30
Attending: INTERNAL MEDICINE
Payer: COMMERCIAL

## 2020-10-30 ENCOUNTER — OFFICE VISIT (OUTPATIENT)
Dept: RHEUMATOLOGY | Facility: CLINIC | Age: 25
End: 2020-10-30
Payer: COMMERCIAL

## 2020-10-30 VITALS
WEIGHT: 132.06 LBS | DIASTOLIC BLOOD PRESSURE: 71 MMHG | BODY MASS INDEX: 20.73 KG/M2 | SYSTOLIC BLOOD PRESSURE: 99 MMHG | HEART RATE: 81 BPM | HEIGHT: 67 IN

## 2020-10-30 DIAGNOSIS — R53.83 FATIGUE, UNSPECIFIED TYPE: ICD-10-CM

## 2020-10-30 DIAGNOSIS — R35.89 POLYURIA: Primary | ICD-10-CM

## 2020-10-30 DIAGNOSIS — R76.8 POSITIVE ANA (ANTINUCLEAR ANTIBODY): ICD-10-CM

## 2020-10-30 DIAGNOSIS — F41.9 ANXIETY: ICD-10-CM

## 2020-10-30 LAB
C3 SERPL-MCNC: 101 MG/DL (ref 50–180)
C4 SERPL-MCNC: 23 MG/DL (ref 11–44)
DAT IGG-SP REAG RBC-IMP: NORMAL

## 2020-10-30 PROCEDURE — 3008F PR BODY MASS INDEX (BMI) DOCUMENTED: ICD-10-PCS | Mod: CPTII,S$GLB,, | Performed by: INTERNAL MEDICINE

## 2020-10-30 PROCEDURE — 85613 RUSSELL VIPER VENOM DILUTED: CPT

## 2020-10-30 PROCEDURE — 99999 PR PBB SHADOW E&M-EST. PATIENT-LVL V: CPT | Mod: PBBFAC,,, | Performed by: INTERNAL MEDICINE

## 2020-10-30 PROCEDURE — 3008F BODY MASS INDEX DOCD: CPT | Mod: CPTII,S$GLB,, | Performed by: INTERNAL MEDICINE

## 2020-10-30 PROCEDURE — 99205 PR OFFICE/OUTPT VISIT, NEW, LEVL V, 60-74 MIN: ICD-10-PCS | Mod: S$GLB,,, | Performed by: INTERNAL MEDICINE

## 2020-10-30 PROCEDURE — 86255 FLUORESCENT ANTIBODY SCREEN: CPT

## 2020-10-30 PROCEDURE — 86880 COOMBS TEST DIRECT: CPT

## 2020-10-30 PROCEDURE — 86160 COMPLEMENT ANTIGEN: CPT | Mod: 59

## 2020-10-30 PROCEDURE — 86225 DNA ANTIBODY NATIVE: CPT

## 2020-10-30 PROCEDURE — 83520 IMMUNOASSAY QUANT NOS NONAB: CPT

## 2020-10-30 PROCEDURE — 86160 COMPLEMENT ANTIGEN: CPT

## 2020-10-30 PROCEDURE — 86147 CARDIOLIPIN ANTIBODY EA IG: CPT

## 2020-10-30 PROCEDURE — 99205 OFFICE O/P NEW HI 60 MIN: CPT | Mod: S$GLB,,, | Performed by: INTERNAL MEDICINE

## 2020-10-30 PROCEDURE — 99999 PR PBB SHADOW E&M-EST. PATIENT-LVL V: ICD-10-PCS | Mod: PBBFAC,,, | Performed by: INTERNAL MEDICINE

## 2020-10-30 PROCEDURE — 86146 BETA-2 GLYCOPROTEIN ANTIBODY: CPT

## 2020-10-30 NOTE — PROGRESS NOTES
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     Neurologist predicted that this was the reason for seizures     His head was very large when he was born    He had a lung condition when he needed respirator all the time         Labs     SONY positive    Reasons for the testing    Fatigue  Migraines /One sided   Neurology is following upon this     Insomnia : has to wake up three times to go to the bathroom  So this disturbs sleep  Headaches are in the mornings   No snoring  Sleep talks as per his friend  He tosses and turns a lot when he sleeps  Not drowsy during the day    Oral ulcers less frequent     Dry mouth wakes him up a few times at night     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    Anxiety and depression   Has not seen a psychiatrist    ADHD       ROS    No joint pains or stiffness or swelling   No skin rashes,malar rash,photosensitivity   No telangiectasias   No calcinosis   No psoriasis   No patchy alopecia   No pleurisy or any cardiopulmonary complaints   No dysphagia,diplopia and dysphonia and muscle weakness   No n/v/d  Occasional constipation  No acid reflux+   No raynaud's+   No digital ulcers   No cytopenias   No renal issues   No blood clots   No fever,chills,night sweats,weight loss and loss of appetite   No new onset headaches   No recurrent conjunctivitis or uveitis or scleritis or episcleritis   No chronic or bloody diarrhea with no u colitis or crohn's /inflammatory bowel disease   No  penile and urethral  d/c/STDs/no ulcers   No unexplained lymphadenopathy or parotitis  No strokes  "    Past history  Seizures  ADHD  Headache    Family history  HTN,cancer,heart disease,COPD  RA : maternal grandmother  Maternal niece has crohn's     Social history  Not a smoker,alcohol user       Review of Systems     As detailed above         Physical Exam    Laboratory abnormalities    White count always nml  H/h nml  plts nml    Iron and b12 nml  hba1c nml    ESR nml  CRP nml    Creatinine nml    LFTs nml  Only once in 2017 it was really deranged   ?? 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     The most significant abnormality relates to the innumerable sub-ependymal nodular foci of signal abnormality which follow gray matter signal lining the atria, occipital horns, and body of the lateral ventricles which are compatible with foci of gray   matter heterotopia .  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 No schizencephaly.  There is a cavum   septum pellucidum.  There is asymmetric enlargement of the frontal horn of the left lateral ventricle relative to the right frontal horn and there is mild rightward displacement of the right frontal horn.  No obstructing lesion within the left or right   foramen of Monro.  No acute/recent major vascular territory cerebral infarction, parenchymal hemorrhage, or mass.  No extra-axial fluid collections or blood products.  No hydrocephalus.  The hippocampi are difficult to evaluate.     There is a prominent cisterna magna present.The cerebellar vermis is " well formed.  No posterior fossa cyst formation.  No Chiari malformation the sella and suprasellar regions are unremarkable.  The skull base flow voids are unremarkable.         The skull base flow voids are difficult to assess secondary to motion but show no obvious abnormality.  There is fluid present within the right mastoid air cells.  Please correlate clinically for symptoms of mastoiditis.      Impression         1.  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     2.  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  This is not optimally evaluated secondary to motion artifact and metal artifact within the mouth.     3. Dysgenesis of the corpus callosum.     4.  Fluid within right mastoid air cells.  Please correlate clinically for symptoms of mastoiditis.         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     Medications    Excedrin migraine as needed  Vit d  Fish oils  Keppra   Lamisil   MVI      Assessment       Very pleasant young boy who was born premature  He was born 1.5 pounds    Had structural abnormalities on the brain MRI which we still see on the MRI brain 2012  He has a diagnosis of partial seizures with secondary generalisation    He has had prior lung issues but none now    He comes in with     Fatigue  He has very disturbed sleep since he suffers from polyuria and that wakes him up at night  He has non restorative sleep  He doesn't snore  He has no day time somnolence  He has GH deficiency and has been evaluated in the past  He also seems to have a poor appetite and his BMI is only 20.68    He does suffer from migraines  He also has anxiety and depression of course from all the  stressors  His brother has had a healthy childhood and is now in med school  I suspect this is causing psychological issues     He has no symptoms of systemic lupus  He is SONY positive but profile negative   He has no clinical criteria : his oral ulcers are very rare and are not the typical roof of the mouth ulcers painless  He has dry mouth but he is also urinating a lot the whole day and all night   He keeps drinking water at night since he is thirsty    Lab criteria     Not met  No cytopenias  No proteinuria  No inf markers    I am less worried that he has systemic lupus    His neurologic symptoms : seizures due to the structural damage not neurolupus  Also no symptoms of systemic lupus    I suspect psychosomatic issues  Headaches/migraines  Anxiety and depression  Fatigue     Also possibly diabetes insipidus vs diabetes mellitus  Polyuria vs polydypsia       1. Polyuria    2. Positive SONY (antinuclear antibody)    3. Fatigue, unspecified type    4. Anxiety            New problem     Plan      Polyuria : endocrine    Lupus labs for completion    Psychiatry    Podiatry    D/C    RTN pallavi     Eddie was seen today for disease management.    Diagnoses and all orders for this visit:    Polyuria  -     Ambulatory referral/consult to Endocrinology; Future    Positive SONY (antinuclear antibody)  -     Ambulatory referral/consult to Rheumatology  -     C3 Complement; Future  -     C4 Complement; Future  -     Anti-DNA Ab, Double-Stranded; Future  -     Cardiolipin antibody; Future  -     Beta-2 Glycoprotein Abs (IgA, IgG, IgM); Future  -     DRVVT; Future  -     Protein/Creatinine Ratio, Urine; Future  -     Urinalysis; Future  -     RIBOSOMAL P ANTIBODY, JORDAN; Future  -     Direct antiglobulin test; Future  -     NMO AQUAPORIN-4-IGG, SERUM; Future    Fatigue, unspecified type  -     Ambulatory referral/consult to Rheumatology  -     C3 Complement; Future  -     C4 Complement; Future  -     Anti-DNA Ab, Double-Stranded;  Future  -     Cardiolipin antibody; Future  -     Beta-2 Glycoprotein Abs (IgA, IgG, IgM); Future  -     DRVVT; Future  -     Protein/Creatinine Ratio, Urine; Future  -     Urinalysis; Future  -     RIBOSOMAL P ANTIBODY, JORDAN; Future    Anxiety  -     Ambulatory referral/consult to Psychiatry; Future

## 2020-10-30 NOTE — LETTER
October 30, 2020      Calixto Reyes MD  [address]  [facility] [address]           [facility] [address]  [address]  [facility] [address]  Phone: [phone]  Fax: [phone]          Patient: Eddie Light   MR Number: [id number]   YOB: 1995   Date of Visit: 10/30/2020       Dear Dr. Calixto Reyes:    Thank you for referring Eddie Light to me for evaluation. Attached you will find relevant portions of my assessment and plan of care.    If you have questions, please do not hesitate to call me. I look forward to following Eddie Light along with you.    Sincerely,    Dmitry Sun MD    Enclosure  CC:  No Recipients    If you would like to receive this communication electronically, please contact [email or web address] or [phone] to request more information on [facility] Link access.    For providers and/or their staff who would like to refer a patient to [facility], please contact us through our one-stop-shop provider referral line, [facility], at [phone].    If you feel you have received this communication in error or would no longer like to receive these types of communications, please e-mail [email or web address]

## 2020-10-30 NOTE — PROGRESS NOTES
Rapid3 Question Responses and Scores 10/30/2020   MDHAQ Score 0   Psychologic Score 0   Pain Score 0   When you awakened in the morning OVER THE LAST WEEK, did you feel stiff? No   Fatigue Score 0   Global Health Score 2   RAPID3 Score 0.66         Answers for HPI/ROS submitted by the patient on 10/30/2020   fever: No  eye redness: No  mouth sores: No  headaches: Yes  shortness of breath: No  chest pain: No  trouble swallowing: No  diarrhea: No  constipation: No  unexpected weight change: Yes  genital sore: No  During the last 3 days, have you had a skin rash?: No  Bruises or bleeds easily: No  cough: No

## 2020-11-02 LAB
CARDIOLIPIN IGG SER IA-ACNC: <9.4 GPL (ref 0–14.99)
CARDIOLIPIN IGM SER IA-ACNC: <9.4 MPL (ref 0–12.49)
DSDNA AB SER-ACNC: NORMAL [IU]/ML
RIBOSOMAL P IGG SER-ACNC: <0.2 U

## 2020-11-03 ENCOUNTER — HOSPITAL ENCOUNTER (OUTPATIENT)
Dept: RADIOLOGY | Facility: HOSPITAL | Age: 25
Discharge: HOME | End: 2020-11-03
Attending: FAMILY MEDICINE
Payer: COMMERCIAL

## 2020-11-03 DIAGNOSIS — G40.209 LOCALIZATION-RELATED (FOCAL) (PARTIAL) SYMPTOMATIC EPILEPSY AND EPILEPTIC SYNDROMES WITH COMPLEX PARTIAL SEIZURES, NOT INTRACTABLE, WITHOUT STATUS EPILEPTICUS: ICD-10-CM

## 2020-11-03 DIAGNOSIS — R51.9 NEW ONSET HEADACHE: ICD-10-CM

## 2020-11-03 LAB
B2 GLYCOPROT1 IGA SER QL: <9 SAU
B2 GLYCOPROT1 IGG SER QL: <9 SGU
B2 GLYCOPROT1 IGM SER QL: <9 SMU
LA PPP-IMP: NEGATIVE

## 2020-11-03 PROCEDURE — 70551 MRI BRAIN STEM W/O DYE: CPT | Mod: TC,PO

## 2020-11-03 PROCEDURE — 70551 MRI BRAIN WITHOUT CONTRAST: ICD-10-PCS | Mod: 26,,, | Performed by: RADIOLOGY

## 2020-11-03 PROCEDURE — 70551 MRI BRAIN STEM W/O DYE: CPT | Mod: 26,,, | Performed by: RADIOLOGY

## 2020-11-04 ENCOUNTER — PATIENT MESSAGE (OUTPATIENT)
Dept: FAMILY MEDICINE | Facility: CLINIC | Age: 25
End: 2020-11-04

## 2020-11-04 LAB — NMO/AQP4 FACS,S: NEGATIVE

## 2021-02-23 ENCOUNTER — PATIENT MESSAGE (OUTPATIENT)
Dept: RHEUMATOLOGY | Facility: CLINIC | Age: 26
End: 2021-02-23

## 2021-03-26 ENCOUNTER — PATIENT MESSAGE (OUTPATIENT)
Dept: FAMILY MEDICINE | Facility: CLINIC | Age: 26
End: 2021-03-26

## 2021-03-26 DIAGNOSIS — L60.0 INGROWN NAIL: Primary | ICD-10-CM

## 2021-03-31 ENCOUNTER — IMMUNIZATION (OUTPATIENT)
Dept: PRIMARY CARE CLINIC | Facility: CLINIC | Age: 26
End: 2021-03-31
Payer: COMMERCIAL

## 2021-03-31 DIAGNOSIS — Z23 NEED FOR VACCINATION: Primary | ICD-10-CM

## 2021-03-31 PROCEDURE — 91303 PR SARSCOV2 VAC AD26 .5ML IM: CPT | Mod: S$GLB,,, | Performed by: INTERNAL MEDICINE

## 2021-03-31 PROCEDURE — 91303 PR SARSCOV2 VAC AD26 .5ML IM: ICD-10-PCS | Mod: S$GLB,,, | Performed by: INTERNAL MEDICINE

## 2021-03-31 PROCEDURE — 0031A PR IMMUNIZ ADMIN, SARS-COV-2 COVID-19 VACC, 5X10VP/0.5ML: ICD-10-PCS | Mod: CV19,S$GLB,, | Performed by: INTERNAL MEDICINE

## 2021-03-31 PROCEDURE — 0031A PR IMMUNIZ ADMIN, SARS-COV-2 COVID-19 VACC, 5X10VP/0.5ML: CPT | Mod: CV19,S$GLB,, | Performed by: INTERNAL MEDICINE

## 2021-04-12 RX ORDER — LEVETIRACETAM 500 MG/1
1500 TABLET, EXTENDED RELEASE ORAL DAILY
Qty: 90 TABLET | Refills: 11 | Status: SHIPPED | OUTPATIENT
Start: 2021-04-12 | End: 2022-03-25 | Stop reason: SDUPTHER

## 2021-05-10 ENCOUNTER — PATIENT MESSAGE (OUTPATIENT)
Dept: FAMILY MEDICINE | Facility: CLINIC | Age: 26
End: 2021-05-10

## 2022-02-28 ENCOUNTER — PATIENT MESSAGE (OUTPATIENT)
Dept: ADMINISTRATIVE | Facility: HOSPITAL | Age: 27
End: 2022-02-28
Payer: COMMERCIAL

## 2022-03-23 ENCOUNTER — IMMUNIZATION (OUTPATIENT)
Dept: FAMILY MEDICINE | Facility: CLINIC | Age: 27
End: 2022-03-23
Payer: COMMERCIAL

## 2022-03-23 DIAGNOSIS — Z23 NEED FOR VACCINATION: Primary | ICD-10-CM

## 2022-03-23 PROCEDURE — 0053A COVID-19, MRNA, LNP-S, PF, 30 MCG/0.3 ML DOSE VACCINE (PFIZER): CPT | Mod: S$GLB,,, | Performed by: FAMILY MEDICINE

## 2022-03-23 PROCEDURE — 91305 COVID-19, MRNA, LNP-S, PF, 30 MCG/0.3 ML DOSE VACCINE (PFIZER): CPT | Mod: S$GLB,,, | Performed by: FAMILY MEDICINE

## 2022-03-23 PROCEDURE — 0053A COVID-19, MRNA, LNP-S, PF, 30 MCG/0.3 ML DOSE VACCINE (PFIZER): ICD-10-PCS | Mod: S$GLB,,, | Performed by: FAMILY MEDICINE

## 2022-03-23 PROCEDURE — 91305 COVID-19, MRNA, LNP-S, PF, 30 MCG/0.3 ML DOSE VACCINE (PFIZER): ICD-10-PCS | Mod: S$GLB,,, | Performed by: FAMILY MEDICINE

## 2022-03-25 RX ORDER — LEVETIRACETAM 500 MG/1
1500 TABLET, EXTENDED RELEASE ORAL DAILY
Qty: 90 TABLET | Refills: 0 | Status: SHIPPED | OUTPATIENT
Start: 2022-03-25 | End: 2022-04-24 | Stop reason: SDUPTHER

## 2022-04-24 RX ORDER — LEVETIRACETAM 500 MG/1
1500 TABLET, EXTENDED RELEASE ORAL DAILY
Qty: 90 TABLET | Refills: 0 | Status: SHIPPED | OUTPATIENT
Start: 2022-04-24 | End: 2022-05-24 | Stop reason: SDUPTHER

## 2022-04-24 NOTE — TELEPHONE ENCOUNTER
The patient has not been seen in >1 year.  This refill has been approved, but follow up will be necessary for further refills.  This can be with one of the NPs.

## 2022-04-28 ENCOUNTER — PATIENT MESSAGE (OUTPATIENT)
Dept: NEUROLOGY | Facility: CLINIC | Age: 27
End: 2022-04-28
Payer: COMMERCIAL

## 2022-04-28 ENCOUNTER — OFFICE VISIT (OUTPATIENT)
Dept: NEUROLOGY | Facility: CLINIC | Age: 27
End: 2022-04-28
Payer: COMMERCIAL

## 2022-04-28 VITALS
SYSTOLIC BLOOD PRESSURE: 112 MMHG | HEART RATE: 84 BPM | WEIGHT: 133.25 LBS | BODY MASS INDEX: 20.87 KG/M2 | RESPIRATION RATE: 18 BRPM | DIASTOLIC BLOOD PRESSURE: 73 MMHG

## 2022-04-28 DIAGNOSIS — R63.0 DECREASED APPETITE: ICD-10-CM

## 2022-04-28 DIAGNOSIS — G40.909 SEIZURE DISORDER: Primary | ICD-10-CM

## 2022-04-28 DIAGNOSIS — R53.83 FATIGUE, UNSPECIFIED TYPE: ICD-10-CM

## 2022-04-28 DIAGNOSIS — R41.3 MEMORY LOSS: ICD-10-CM

## 2022-04-28 DIAGNOSIS — G43.909 MIGRAINE WITHOUT STATUS MIGRAINOSUS, NOT INTRACTABLE, UNSPECIFIED MIGRAINE TYPE: ICD-10-CM

## 2022-04-28 PROCEDURE — 99999 PR PBB SHADOW E&M-EST. PATIENT-LVL IV: CPT | Mod: PBBFAC,,, | Performed by: PSYCHIATRY & NEUROLOGY

## 2022-04-28 PROCEDURE — 3074F SYST BP LT 130 MM HG: CPT | Mod: CPTII,S$GLB,, | Performed by: PSYCHIATRY & NEUROLOGY

## 2022-04-28 PROCEDURE — 3078F DIAST BP <80 MM HG: CPT | Mod: CPTII,S$GLB,, | Performed by: PSYCHIATRY & NEUROLOGY

## 2022-04-28 PROCEDURE — 99215 PR OFFICE/OUTPT VISIT, EST, LEVL V, 40-54 MIN: ICD-10-PCS | Mod: S$GLB,,, | Performed by: PSYCHIATRY & NEUROLOGY

## 2022-04-28 PROCEDURE — 3008F BODY MASS INDEX DOCD: CPT | Mod: CPTII,S$GLB,, | Performed by: PSYCHIATRY & NEUROLOGY

## 2022-04-28 PROCEDURE — 99215 OFFICE O/P EST HI 40 MIN: CPT | Mod: S$GLB,,, | Performed by: PSYCHIATRY & NEUROLOGY

## 2022-04-28 PROCEDURE — 3078F PR MOST RECENT DIASTOLIC BLOOD PRESSURE < 80 MM HG: ICD-10-PCS | Mod: CPTII,S$GLB,, | Performed by: PSYCHIATRY & NEUROLOGY

## 2022-04-28 PROCEDURE — 3008F PR BODY MASS INDEX (BMI) DOCUMENTED: ICD-10-PCS | Mod: CPTII,S$GLB,, | Performed by: PSYCHIATRY & NEUROLOGY

## 2022-04-28 PROCEDURE — 3074F PR MOST RECENT SYSTOLIC BLOOD PRESSURE < 130 MM HG: ICD-10-PCS | Mod: CPTII,S$GLB,, | Performed by: PSYCHIATRY & NEUROLOGY

## 2022-04-28 PROCEDURE — 99999 PR PBB SHADOW E&M-EST. PATIENT-LVL IV: ICD-10-PCS | Mod: PBBFAC,,, | Performed by: PSYCHIATRY & NEUROLOGY

## 2022-04-28 PROCEDURE — 1159F PR MEDICATION LIST DOCUMENTED IN MEDICAL RECORD: ICD-10-PCS | Mod: CPTII,S$GLB,, | Performed by: PSYCHIATRY & NEUROLOGY

## 2022-04-28 PROCEDURE — 1159F MED LIST DOCD IN RCRD: CPT | Mod: CPTII,S$GLB,, | Performed by: PSYCHIATRY & NEUROLOGY

## 2022-04-28 NOTE — PROGRESS NOTES
Date of service:  2022    Chief complaint:  Seizures    Interval history:  The patient is a 26 y.o. male seen previously for epilepsy.  I last saw him in .  Since he was last here, he reports an interval seizure, which occurred on 20.  He denies missed doses of medication, sleep disruption, illness, and so forth around this time.  He had taken his Keppra XR 1500 mg daily.  He reports that he was more drowsy during the day on this regimen, so they went back to 1000 mg QHS.    Prior history:  The patient is a 26 y.o. male seen previously in for seizures, last in .  Previously, we tried transitioning him from Zonegran to Lamictal.  He apparently had significant drowsiness on this, so we then transitioned to Keppra.  When I saw him in , no side effects were reported.  He returns today reporting no further seizures.  He does report some morning fatigue which improves over the course of the day.  He wonders if this might be related to his Keppra.  He endorses getting at least 8 hours of sleep at night.  He denies interrupted/disrupted sleep.  He has no history of snoring.  He has never had witnessed sleep apnea.    History of present illness:  The patient is a 26 y.o. male referred for evaluation of episodes suspicious for seizures. These began at about age 10-12.  The patient has no consistent warning, though he says that he has seen a red light before one event.  His seizure is characterized by generalized stiffening and shaking.  There is a history of tongue biting.  This component of this spell lasts for approximately 2 minutes.  Afterwards, he reports confusion/fatigue.  The patient's frequency of events is roughly once a year.    The patient has no family history of seizures.  He reports a history of prenatal/ complications with the patient having been born prematurely. There is no history of febrile seizures.  He notes no history of CNS infections. He claims no history of significant  head trauma. There is a history of developmental abnormalities on his MRI of the brain as described below.    Current AEDs:  Keppra XR 1000 mg QHSD    Prior AEDs:  Phenobarbital (lethargy)  Zonegran (drowsiness)  Lamictal (drowsiness)      Past Medical History:   Diagnosis Date    ADHD (attention deficit hyperactivity disorder)     Headache(784.0)     Nevus     seen by Derm    Otitis media     Seizures 2006    x 1    Short stature for age     Dr. Elena Garcia    Strep throat     Wears glasses     Dr. Lara       Past Surgical History:   Procedure Laterality Date    HERNIA REPAIR      TYMPANOSTOMY TUBE PLACEMENT         Family history:  Family History   Problem Relation Age of Onset    Hypertension Maternal Grandmother     Cancer Maternal Grandmother     Heart disease Maternal Grandfather     COPD Paternal Grandmother     Hypertension Paternal Grandmother     COPD Paternal Grandfather     Hypertension Paternal Grandfather      No reported FH of seizures      Social history:  Social History     Socioeconomic History    Marital status: Single   Tobacco Use    Smoking status: Never Smoker    Smokeless tobacco: Never Used   Substance and Sexual Activity    Alcohol use: Not Currently     Alcohol/week: 1.0 standard drink     Types: 1 Standard drinks or equivalent per week     Comment: very rare    Drug use: No    Sexual activity: Not Currently     Partners: Female   No reported T/E/D     Review of Systems  A comprehensive ROS was previously performed.  It is not significantly changed except as noted above.     Physical exam:  /73 (BP Location: Left arm, Patient Position: Sitting, BP Method: Medium (Automatic))   Pulse 84   Resp 18   Wt 60.5 kg (133 lb 4.3 oz)   BMI 20.87 kg/m²   General: Well developed, well nourished.  No acute distress.  HEENT: Atraumatic, normocephalic.  Neck: Supple, trachea midline.  Cardiovascular: Regular rate and rhythm.  Pulmonary: No increased work of  "breathing.  Abdomen/GI: No guarding.  Musculoskeletal: No obvious joint deformities, moves all extremities well.    Neurological exam:   Mental status: Awake and alert.  Oriented x4.  Speech fluent and appropriate.  Recent and remote memory appear to be grossly intact.  Fund of knowledge grossly normal.  Cranial nerves: Pupils equal round and reactive to light, extraocular movements intact, facial strength and sensation intact bilaterally, palate and tongue midline, hearing grossly intact bilaterally.  Motor: 5 out of 5 strength throughout the upper and lower extremities bilaterally. Normal bulk and tone.  Sensation: Intact to light touch and temperature bilaterally.  DTR: 2+ at the knees and biceps bilaterally.  Coordination: Finger-nose-finger testing intact bilaterally.  Gait: Normal gait. Good tandem.    Data base:  Notes of the referring physician were reviewed.  Briefly summarized, these discussed his recent seizure history and indicated that the patient was being transitioned from phenobarbital to Zonegran secondary to lethargy.    Labs:  Labs checked at our last visit include a normal CMP, CBC, and CK.  His LEV level was 11.9.    MRI brain (2012):  "1. 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  2. 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 This is not optimally evaluated secondary to motion artifact and metal artifact within the mouth.  3. Dysgenesis of the corpus callosum.  4. Fluid within right mastoid air cells. Please correlate clinically for symptoms of mastoiditis."    EEG (11/15):  "Normal EEG"    Assessment and plan:  The patient is a 26 y.o. male referred for evaluation for seizures. 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  We will check EEG and then likely proceed to ambulatory EEG.  As far as medications go, we will continue his current regimen for " now.  We will check relevant labs.  We will consider neuropsychological evaluation.  State law as it pertains to driving for individuals with seizures has been discussed.  The patient has also been counseled on seizure safety.      Regarding his concerns about poor appetite, we will have him follow up with his PCP.  We will check some serologies.    For his headaches, we will have him establish in headache clinic.    We will plan on seeing the patient back in a few months.

## 2022-05-02 ENCOUNTER — LAB VISIT (OUTPATIENT)
Dept: LAB | Facility: HOSPITAL | Age: 27
End: 2022-05-02
Attending: PSYCHIATRY & NEUROLOGY
Payer: COMMERCIAL

## 2022-05-02 DIAGNOSIS — R63.0 DECREASED APPETITE: ICD-10-CM

## 2022-05-02 DIAGNOSIS — G40.909 SEIZURE DISORDER: ICD-10-CM

## 2022-05-02 DIAGNOSIS — R53.83 FATIGUE, UNSPECIFIED TYPE: ICD-10-CM

## 2022-05-02 LAB
ALBUMIN SERPL BCP-MCNC: 4.6 G/DL (ref 3.5–5.2)
ALP SERPL-CCNC: 69 U/L (ref 55–135)
ALT SERPL W/O P-5'-P-CCNC: 18 U/L (ref 10–44)
ANION GAP SERPL CALC-SCNC: 8 MMOL/L (ref 8–16)
AST SERPL-CCNC: 22 U/L (ref 10–40)
BASOPHILS # BLD AUTO: 0.02 K/UL (ref 0–0.2)
BASOPHILS NFR BLD: 0.4 % (ref 0–1.9)
BILIRUB SERPL-MCNC: 1.1 MG/DL (ref 0.1–1)
BUN SERPL-MCNC: 11 MG/DL (ref 6–20)
CALCIUM SERPL-MCNC: 10 MG/DL (ref 8.7–10.5)
CHLORIDE SERPL-SCNC: 105 MMOL/L (ref 95–110)
CO2 SERPL-SCNC: 27 MMOL/L (ref 23–29)
CREAT SERPL-MCNC: 0.9 MG/DL (ref 0.5–1.4)
DIFFERENTIAL METHOD: NORMAL
EOSINOPHIL # BLD AUTO: 0 K/UL (ref 0–0.5)
EOSINOPHIL NFR BLD: 0.5 % (ref 0–8)
ERYTHROCYTE [DISTWIDTH] IN BLOOD BY AUTOMATED COUNT: 12.2 % (ref 11.5–14.5)
EST. GFR  (AFRICAN AMERICAN): >60 ML/MIN/1.73 M^2
EST. GFR  (NON AFRICAN AMERICAN): >60 ML/MIN/1.73 M^2
FOLATE SERPL-MCNC: 12.1 NG/ML (ref 4–24)
GLUCOSE SERPL-MCNC: 86 MG/DL (ref 70–110)
HCT VFR BLD AUTO: 46.4 % (ref 40–54)
HGB BLD-MCNC: 15.1 G/DL (ref 14–18)
IMM GRANULOCYTES # BLD AUTO: 0.01 K/UL (ref 0–0.04)
IMM GRANULOCYTES NFR BLD AUTO: 0.2 % (ref 0–0.5)
LYMPHOCYTES # BLD AUTO: 2.3 K/UL (ref 1–4.8)
LYMPHOCYTES NFR BLD: 41.4 % (ref 18–48)
MCH RBC QN AUTO: 29.2 PG (ref 27–31)
MCHC RBC AUTO-ENTMCNC: 32.5 G/DL (ref 32–36)
MCV RBC AUTO: 90 FL (ref 82–98)
MONOCYTES # BLD AUTO: 0.4 K/UL (ref 0.3–1)
MONOCYTES NFR BLD: 7.7 % (ref 4–15)
NEUTROPHILS # BLD AUTO: 2.8 K/UL (ref 1.8–7.7)
NEUTROPHILS NFR BLD: 49.8 % (ref 38–73)
NRBC BLD-RTO: 0 /100 WBC
PLATELET # BLD AUTO: 269 K/UL (ref 150–450)
PMV BLD AUTO: 10.3 FL (ref 9.2–12.9)
POTASSIUM SERPL-SCNC: 4.1 MMOL/L (ref 3.5–5.1)
PREALB SERPL-MCNC: 25 MG/DL (ref 20–43)
PROT SERPL-MCNC: 7.5 G/DL (ref 6–8.4)
RBC # BLD AUTO: 5.17 M/UL (ref 4.6–6.2)
SODIUM SERPL-SCNC: 140 MMOL/L (ref 136–145)
TSH SERPL DL<=0.005 MIU/L-ACNC: 1.14 UIU/ML (ref 0.4–4)
VIT B12 SERPL-MCNC: 461 PG/ML (ref 210–950)
WBC # BLD AUTO: 5.56 K/UL (ref 3.9–12.7)

## 2022-05-02 PROCEDURE — 82746 ASSAY OF FOLIC ACID SERUM: CPT | Performed by: PSYCHIATRY & NEUROLOGY

## 2022-05-02 PROCEDURE — 82607 VITAMIN B-12: CPT | Performed by: PSYCHIATRY & NEUROLOGY

## 2022-05-02 PROCEDURE — 85025 COMPLETE CBC W/AUTO DIFF WBC: CPT | Performed by: PSYCHIATRY & NEUROLOGY

## 2022-05-02 PROCEDURE — 84134 ASSAY OF PREALBUMIN: CPT | Performed by: PSYCHIATRY & NEUROLOGY

## 2022-05-02 PROCEDURE — 84443 ASSAY THYROID STIM HORMONE: CPT | Performed by: PSYCHIATRY & NEUROLOGY

## 2022-05-02 PROCEDURE — 36415 COLL VENOUS BLD VENIPUNCTURE: CPT | Mod: PO | Performed by: PSYCHIATRY & NEUROLOGY

## 2022-05-02 PROCEDURE — 80053 COMPREHEN METABOLIC PANEL: CPT | Performed by: PSYCHIATRY & NEUROLOGY

## 2022-05-16 ENCOUNTER — PATIENT MESSAGE (OUTPATIENT)
Dept: FAMILY MEDICINE | Facility: CLINIC | Age: 27
End: 2022-05-16
Payer: COMMERCIAL

## 2022-05-17 ENCOUNTER — TELEPHONE (OUTPATIENT)
Dept: NEUROLOGY | Facility: CLINIC | Age: 27
End: 2022-05-17
Payer: COMMERCIAL

## 2022-05-24 RX ORDER — LEVETIRACETAM 500 MG/1
1500 TABLET, EXTENDED RELEASE ORAL DAILY
Qty: 90 TABLET | Refills: 0 | Status: SHIPPED | OUTPATIENT
Start: 2022-05-24 | End: 2022-06-28 | Stop reason: SDUPTHER

## 2022-05-25 ENCOUNTER — TELEPHONE (OUTPATIENT)
Dept: NEUROLOGY | Facility: CLINIC | Age: 27
End: 2022-05-25
Payer: COMMERCIAL

## 2022-05-31 ENCOUNTER — PATIENT MESSAGE (OUTPATIENT)
Dept: ADMINISTRATIVE | Facility: HOSPITAL | Age: 27
End: 2022-05-31
Payer: COMMERCIAL

## 2022-06-21 ENCOUNTER — OFFICE VISIT (OUTPATIENT)
Dept: NEUROLOGY | Facility: CLINIC | Age: 27
End: 2022-06-21
Payer: COMMERCIAL

## 2022-06-21 VITALS
HEIGHT: 67 IN | RESPIRATION RATE: 17 BRPM | BODY MASS INDEX: 20.59 KG/M2 | SYSTOLIC BLOOD PRESSURE: 99 MMHG | DIASTOLIC BLOOD PRESSURE: 67 MMHG | WEIGHT: 131.19 LBS | HEART RATE: 70 BPM

## 2022-06-21 DIAGNOSIS — G47.9 TROUBLE IN SLEEPING: ICD-10-CM

## 2022-06-21 DIAGNOSIS — G43.709 CHRONIC MIGRAINE WITHOUT AURA WITHOUT STATUS MIGRAINOSUS, NOT INTRACTABLE: Primary | ICD-10-CM

## 2022-06-21 PROCEDURE — 1159F MED LIST DOCD IN RCRD: CPT | Mod: CPTII,S$GLB,, | Performed by: PHYSICIAN ASSISTANT

## 2022-06-21 PROCEDURE — 99214 OFFICE O/P EST MOD 30 MIN: CPT | Mod: S$GLB,,, | Performed by: PHYSICIAN ASSISTANT

## 2022-06-21 PROCEDURE — 99999 PR PBB SHADOW E&M-EST. PATIENT-LVL IV: ICD-10-PCS | Mod: PBBFAC,,, | Performed by: PHYSICIAN ASSISTANT

## 2022-06-21 PROCEDURE — 1160F RVW MEDS BY RX/DR IN RCRD: CPT | Mod: CPTII,S$GLB,, | Performed by: PHYSICIAN ASSISTANT

## 2022-06-21 PROCEDURE — 3074F PR MOST RECENT SYSTOLIC BLOOD PRESSURE < 130 MM HG: ICD-10-PCS | Mod: CPTII,S$GLB,, | Performed by: PHYSICIAN ASSISTANT

## 2022-06-21 PROCEDURE — 3074F SYST BP LT 130 MM HG: CPT | Mod: CPTII,S$GLB,, | Performed by: PHYSICIAN ASSISTANT

## 2022-06-21 PROCEDURE — 3008F PR BODY MASS INDEX (BMI) DOCUMENTED: ICD-10-PCS | Mod: CPTII,S$GLB,, | Performed by: PHYSICIAN ASSISTANT

## 2022-06-21 PROCEDURE — 3008F BODY MASS INDEX DOCD: CPT | Mod: CPTII,S$GLB,, | Performed by: PHYSICIAN ASSISTANT

## 2022-06-21 PROCEDURE — 99214 PR OFFICE/OUTPT VISIT, EST, LEVL IV, 30-39 MIN: ICD-10-PCS | Mod: S$GLB,,, | Performed by: PHYSICIAN ASSISTANT

## 2022-06-21 PROCEDURE — 3078F PR MOST RECENT DIASTOLIC BLOOD PRESSURE < 80 MM HG: ICD-10-PCS | Mod: CPTII,S$GLB,, | Performed by: PHYSICIAN ASSISTANT

## 2022-06-21 PROCEDURE — 1160F PR REVIEW ALL MEDS BY PRESCRIBER/CLIN PHARMACIST DOCUMENTED: ICD-10-PCS | Mod: CPTII,S$GLB,, | Performed by: PHYSICIAN ASSISTANT

## 2022-06-21 PROCEDURE — 3078F DIAST BP <80 MM HG: CPT | Mod: CPTII,S$GLB,, | Performed by: PHYSICIAN ASSISTANT

## 2022-06-21 PROCEDURE — 1159F PR MEDICATION LIST DOCUMENTED IN MEDICAL RECORD: ICD-10-PCS | Mod: CPTII,S$GLB,, | Performed by: PHYSICIAN ASSISTANT

## 2022-06-21 PROCEDURE — 99999 PR PBB SHADOW E&M-EST. PATIENT-LVL IV: CPT | Mod: PBBFAC,,, | Performed by: PHYSICIAN ASSISTANT

## 2022-06-21 RX ORDER — RIZATRIPTAN BENZOATE 10 MG/1
TABLET, ORALLY DISINTEGRATING ORAL
Qty: 12 TABLET | Refills: 5 | Status: SHIPPED | OUTPATIENT
Start: 2022-06-21 | End: 2023-02-15 | Stop reason: SDUPTHER

## 2022-06-21 RX ORDER — AMITRIPTYLINE HYDROCHLORIDE 10 MG/1
TABLET, FILM COATED ORAL
Qty: 60 TABLET | Refills: 5 | Status: SHIPPED | OUTPATIENT
Start: 2022-06-21 | End: 2022-08-02 | Stop reason: ALTCHOICE

## 2022-06-21 NOTE — PROGRESS NOTES
[facility]  [facility]  [address] [facility] [address]  [place] [address]  Phone:[phone]  Fax: [phone]   New Patient Consultation  (Follow up by Dr. Rowell, LOV: 2022, epilepsy)    Patient Name: Eddie Light  : 1995  MRN:  [id number]  Today: 2022   chief complaint: Headache    PCP: Calixto Reyes MD.  Approx time examiner entered room: 1:06 PM  Approx time examiner departed room:1:38 PM      Assessment:   Eddie Light is a 26 y.o. right handed male with a PMHx of: epilepsy, ADHD, and HA  whom presents with his father at the request of my colleague, Dr. Rowell for BELLO. BELLO appear to be chronic migrainous, worsened (possibly) by lack of sleep.       Review:    ICD-10-CM ICD-9-CM   1. Chronic migraine without aura without status migrainosus, not intractable  G43.709 346.70   2. Trouble in sleeping  G47.9 780.50         Plan:   Discussed realistic goals of care with patient at length. Discussed medication options, need for lifestyle adjustment. Discussed treatment will take time. Goal will be to reduce frequency/intensity/quantity of HA, not to be completely HA free. Gave copy of S triggers for migraine informational sheet, and discussed clinic's non narcotic policy re: HA. Patient voiced understanding and agreement.            -will have patient track HA, discussed ryan for smart phone           -will have patient work on lifestyle        For HA Prevention:  1 no change to AED regimen, will defer to Dr. Rowell  2 No BB/CCB d/t hypotension   3 offered trazodone vs elavil, chose elavil, discussed adv effects/dosing, can self titrate up to 20 mg Q2h before bed in next week, he agreed       -if that doesn't help, may consider cGRP or Botox     For HA :  1 stop excedrin  2 try maxalt, discussed adv effects/dosing, he agreed    To break up Headaches:  Can consider nerve blocks (not discussed)    Other:  Follow up with Dr. Rowell for seizures          All test  "results as well as any necessary instructions were reviewed and discussed with patient.    Review:  Orders Placed This Encounter    amitriptyline (ELAVIL) 10 MG tablet    rizatriptan (MAXALT-MLT) 10 MG disintegrating tablet         Patient to return to PCP/other specialists for all other problems  Patient to continue on all medications as Rx'd   A detailed AVS was provided to the patient with patient readback   RTO- 6 weeks to review HA log   The patient indicates understanding of these issues and agrees to the plan.    HPI:   Eddie Light is a 26 y.o.right handed, male with a PMHx of: epilepsy, ADHD, and HA  whom presents with his father at the request of my colleague, Dr. Rowell for BELLO.     HA HPI:  Start:for many years, has not changed much, father mentions "he has had HA since a child"   History of trauma (no), History of CNS infection (no), History of Stroke (no)  Location: pain could be anywhere, however, when he has the bad HA/migraines, start over the Left frontal and then further radiate to whole head   Severity: range: 1-8/10  Duration:11 hours  Frequency: daily HA, 30 HD/30  Associated factors (bolded positive) WITH HA ( or migraine): Nausea, vomiting, photophobia, phonophobia, tinnitus, scalp pain, vision loss, diplopia, scintillations, eye pain, jaw pain, weakness?    Tried:excedrin   Triggers (in bold): stress, lack of sleep, too much caffeine, too little caffeine, weather change, seasonal change, strong odours, bright lights, sunlight, food   Currently having a HA?:yes   Positives in bold: Hx of Kidney Stones, asthma, GI bleed, osteoporosis, CAD/MI, CVA/TIA, DM  <---denies   Imaging on file: 11/3/2020 MRI Brain : agenesis of corpus callosum   Therapies tried in past: (failures to be marked, if known---why did it fail?)  For epilepsy: keppra, phenobarbital, zonegran, lamictal  Excedrin        Medication Reconciliation:   Current Outpatient Medications   Medication Sig Dispense Refill    " ASPIRIN/ACETAMINOPHEN/CAFFEINE (EXCEDRIN MIGRAINE ORAL) Take by mouth as needed.      cholecalciferol, vitamin D3, 10 mcg (400 unit) Chew Take 1 capsule by mouth once daily.      fish oil-omega-3 fatty acids 300-1,000 mg capsule Take 1 capsule by mouth once daily.      levetiracetam XR (KEPPRA XR) 500 mg Tb24 24 hr tablet Take 3 tablets (1,500 mg total) by mouth once daily. 90 tablet 0    multivitamin (THERAGRAN) per tablet Take 1 tablet by mouth once daily.       No current facility-administered medications for this visit.     Review of patient's allergies indicates:  No Known Allergies    PMHx:  Past Medical History:   Diagnosis Date    ADHD (attention deficit hyperactivity disorder)     Headache(784.0)     Nevus     seen by Derm    Otitis media     Seizures 2006    x 1    Short stature for age     Dr. Elena Garcia    Strep throat     Wears glasses     Dr. Lara     Past Surgical History:   Procedure Laterality Date    HERNIA REPAIR      TYMPANOSTOMY TUBE PLACEMENT         Fhx:  Family History   Problem Relation Age of Onset    Hypertension Maternal Grandmother     Cancer Maternal Grandmother     Heart disease Maternal Grandfather     COPD Paternal Grandmother     Hypertension Paternal Grandmother     COPD Paternal Grandfather     Hypertension Paternal Grandfather        Shx: background work for [employer], will be in [employer]   Social History     Socioeconomic History    Marital status: Single   Tobacco Use    Smoking status: Never Smoker    Smokeless tobacco: Never Used   Substance and Sexual Activity    Alcohol use: Not Currently     Alcohol/week: 1.0 standard drink     Types: 1 Standard drinks or equivalent per week     Comment: very rare    Drug use: No    Sexual activity: Not Currently     Partners: Female           Labs:   CMP  Sodium   Date Value Ref Range Status   05/02/2022 140 136 - 145 mmol/L Final     Potassium   Date Value Ref Range Status   05/02/2022 4.1  3.5 - 5.1 mmol/L Final     Chloride   Date Value Ref Range Status   05/02/2022 105 95 - 110 mmol/L Final     CO2   Date Value Ref Range Status   05/02/2022 27 23 - 29 mmol/L Final     Glucose   Date Value Ref Range Status   05/02/2022 86 70 - 110 mg/dL Final     BUN   Date Value Ref Range Status   05/02/2022 11 6 - 20 mg/dL Final     Creatinine   Date Value Ref Range Status   05/02/2022 0.9 0.5 - 1.4 mg/dL Final     Calcium   Date Value Ref Range Status   05/02/2022 10.0 8.7 - 10.5 mg/dL Final     Total Protein   Date Value Ref Range Status   05/02/2022 7.5 6.0 - 8.4 g/dL Final     Albumin   Date Value Ref Range Status   05/02/2022 4.6 3.5 - 5.2 g/dL Final     Total Bilirubin   Date Value Ref Range Status   05/02/2022 1.1 (H) 0.1 - 1.0 mg/dL Final     Comment:     For infants and newborns, interpretation of results should be based  on gestational age, weight and in agreement with clinical  observations.    Premature Infant recommended reference ranges:  Up to 24 hours.............<8.0 mg/dL  Up to 48 hours............<12.0 mg/dL  3-5 days..................<15.0 mg/dL  6-29 days.................<15.0 mg/dL       Alkaline Phosphatase   Date Value Ref Range Status   05/02/2022 69 55 - 135 U/L Final     AST   Date Value Ref Range Status   05/02/2022 22 10 - 40 U/L Final     ALT   Date Value Ref Range Status   05/02/2022 18 10 - 44 U/L Final     Anion Gap   Date Value Ref Range Status   05/02/2022 8 8 - 16 mmol/L Final     eGFR if    Date Value Ref Range Status   05/02/2022 >60.0 >60 mL/min/1.73 m^2 Final     eGFR if non    Date Value Ref Range Status   05/02/2022 >60.0 >60 mL/min/1.73 m^2 Final     Comment:     Calculation used to obtain the estimated glomerular filtration  rate (eGFR) is the CKD-EPI equation.        Lab Results   Component Value Date    WBC 5.56 05/02/2022    HGB 15.1 05/02/2022    HCT 46.4 05/02/2022    MCV 90 05/02/2022     05/02/2022           Imaging:  "  11/3/2020 MRI Brain (report):    1. No acute intracranial abnormality.  2. 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       Other testin2022 EEG (Report): This is a normal EEG in an awake and drowsy adult. No potentially epileptiform activity was seen. Please be aware that a normal EEG does not exclude the possibility of an underlying seizure disorder.    Note: I have independently reviewed any/all imaging/labs/tests and agree with the report (s) as documented.  Any discrepancies will be as noted/demarcated by free text.  LANI IBANEZ 2022                     ROS:   Review Of Systems (questions asked, positive or additions in BOLD)  Gen: Weight change, fatigue/malaise, pyrexia   HEENT: Tinnitus, headache,  blurred vision, eye pain, diplopia, photophobia,  nose bleeds, congestion, sore throat, jaw pain, scalp pain, neck stiffness   Card: Palpitations, CP   Pulm: SOB, cough   Vas: Easy bruising, easy bleeding   GI: N/V/D/C, incontinence, hematemesis, hematochezia    : incontinence, hematuria   Endocrine: Temp intolerance, polyuria, polydipsia   M/S: Neck pain, myalgia, back pain, joint pain, falls    Neuro: PER HPI   PSY: Memory loss, confusion, depression, anxiety, trouble in sleep          EXAM:   BP 99/67 (BP Location: Right arm, Patient Position: Sitting, BP Method: Medium (Automatic))   Pulse 70   Resp 17   Ht 5' 7" (1.702 m)   Wt 59.5 kg (131 lb 2.8 oz)   BMI 20.54 kg/m²    GEN:  NAD  HEENT: Atraumatic, Frontalis was NTTP, temporalis was NTTP, vertex NTTP,  nares patent, dentition appropriate,  neck supple, trachea midline, Occiput and trapezius NTTP     EXTREM:   no edema present.    NEURO:  Mental Status:  Awake, alert and appropriately oriented to time, place, and person.  Normal attention and concentration.  Speech is fluent and appropriate language function (I.e., comprehension)    Cranial Nerves:    Extraocular " movements are intact and without nystagmus.  Visual fields are full to confrontation testing. Colour vision change not found.  Facial movement is symmetric.  Facial sensation is intact.  Hearing is normal. Uvula in midline. FROM of neck in all (6) directions, shoulder shrug symmetrical. Tongue in midline without fasiculation.     Motor:  RUE:appropriate against gravity and medium force as tested 5/5              LUE: appropriate against gravity and medium force as tested 5/5              RLE:appropriate against gravity and medium force as tested 5/5              LLE: appropriate against gravity and medium force as tested 5/5  Tremor/pronator drift not apparent.     finger extension strength was strong bilat     Sensory:  RUE  intact light touch, proprioception and temperature  LUE intact light touch, proprioception and temperature    RLE intact light touch  LLE intact light touch      DTR's:                                            R              L  biceps 2+ 2+         brachioradialis 2+ 2+   Knee jerk 2+ 2+        Coordination:  FTN-WNL.     Gait and Stance: Normal manner of stance and gait function testing. Romberg was negative.         This document has been electronically signed by Mr. Patel Mast MPA, PA-C on 6/21/2022, I have personally typed this message using the EMR.       Dr Chinmay MD was available during today's visit.     Personal Protective Equipment:    Personal Protective Equipment was used during this encounter including: KN95 and non latex gloves.          CC: Calixto Reyes MD/Waldemar Rowell MD

## 2022-06-21 NOTE — PATIENT INSTRUCTIONS
Please track your headaches, consider [employer] free ryan for [employer]      To reduce your headaches:  Try the elavil (amitryptilline) 1 pill 2 hours before bed every night for a week, if after a week, no benefits/no side effects, move up to 2 pills         To abort headaches:  Try the maxalt (rizatriptan) 1 melt at onset headache, second melt 2 hours later if needed, no more than 2 melts day/days use in week

## 2022-06-28 RX ORDER — LEVETIRACETAM 500 MG/1
1500 TABLET, EXTENDED RELEASE ORAL DAILY
Qty: 90 TABLET | Refills: 0 | Status: SHIPPED | OUTPATIENT
Start: 2022-06-28 | End: 2022-07-13 | Stop reason: SDUPTHER

## 2022-07-13 RX ORDER — LEVETIRACETAM 500 MG/1
1500 TABLET, EXTENDED RELEASE ORAL DAILY
Qty: 90 TABLET | Refills: 0 | Status: SHIPPED | OUTPATIENT
Start: 2022-07-13 | End: 2022-09-01 | Stop reason: SDUPTHER

## 2022-08-02 ENCOUNTER — OFFICE VISIT (OUTPATIENT)
Dept: NEUROLOGY | Facility: CLINIC | Age: 27
End: 2022-08-02
Payer: COMMERCIAL

## 2022-08-02 ENCOUNTER — TELEPHONE (OUTPATIENT)
Dept: NEUROLOGY | Facility: CLINIC | Age: 27
End: 2022-08-02
Payer: COMMERCIAL

## 2022-08-02 VITALS
WEIGHT: 131 LBS | RESPIRATION RATE: 17 BRPM | HEIGHT: 67 IN | BODY MASS INDEX: 20.56 KG/M2 | HEART RATE: 87 BPM | DIASTOLIC BLOOD PRESSURE: 79 MMHG | SYSTOLIC BLOOD PRESSURE: 120 MMHG

## 2022-08-02 DIAGNOSIS — G43.709 CHRONIC MIGRAINE WITHOUT AURA WITHOUT STATUS MIGRAINOSUS, NOT INTRACTABLE: Primary | ICD-10-CM

## 2022-08-02 DIAGNOSIS — G47.9 TROUBLE IN SLEEPING: ICD-10-CM

## 2022-08-02 DIAGNOSIS — G40.909 SEIZURE DISORDER: ICD-10-CM

## 2022-08-02 PROCEDURE — 1159F MED LIST DOCD IN RCRD: CPT | Mod: CPTII,S$GLB,, | Performed by: PHYSICIAN ASSISTANT

## 2022-08-02 PROCEDURE — 99213 OFFICE O/P EST LOW 20 MIN: CPT | Mod: S$GLB,,, | Performed by: PHYSICIAN ASSISTANT

## 2022-08-02 PROCEDURE — 1159F PR MEDICATION LIST DOCUMENTED IN MEDICAL RECORD: ICD-10-PCS | Mod: CPTII,S$GLB,, | Performed by: PHYSICIAN ASSISTANT

## 2022-08-02 PROCEDURE — 3074F SYST BP LT 130 MM HG: CPT | Mod: CPTII,S$GLB,, | Performed by: PHYSICIAN ASSISTANT

## 2022-08-02 PROCEDURE — 1160F PR REVIEW ALL MEDS BY PRESCRIBER/CLIN PHARMACIST DOCUMENTED: ICD-10-PCS | Mod: CPTII,S$GLB,, | Performed by: PHYSICIAN ASSISTANT

## 2022-08-02 PROCEDURE — 99213 PR OFFICE/OUTPT VISIT, EST, LEVL III, 20-29 MIN: ICD-10-PCS | Mod: S$GLB,,, | Performed by: PHYSICIAN ASSISTANT

## 2022-08-02 PROCEDURE — 3008F PR BODY MASS INDEX (BMI) DOCUMENTED: ICD-10-PCS | Mod: CPTII,S$GLB,, | Performed by: PHYSICIAN ASSISTANT

## 2022-08-02 PROCEDURE — 99999 PR PBB SHADOW E&M-EST. PATIENT-LVL III: ICD-10-PCS | Mod: PBBFAC,,, | Performed by: PHYSICIAN ASSISTANT

## 2022-08-02 PROCEDURE — 3074F PR MOST RECENT SYSTOLIC BLOOD PRESSURE < 130 MM HG: ICD-10-PCS | Mod: CPTII,S$GLB,, | Performed by: PHYSICIAN ASSISTANT

## 2022-08-02 PROCEDURE — 99999 PR PBB SHADOW E&M-EST. PATIENT-LVL III: CPT | Mod: PBBFAC,,, | Performed by: PHYSICIAN ASSISTANT

## 2022-08-02 PROCEDURE — 1160F RVW MEDS BY RX/DR IN RCRD: CPT | Mod: CPTII,S$GLB,, | Performed by: PHYSICIAN ASSISTANT

## 2022-08-02 PROCEDURE — 3078F DIAST BP <80 MM HG: CPT | Mod: CPTII,S$GLB,, | Performed by: PHYSICIAN ASSISTANT

## 2022-08-02 PROCEDURE — 3078F PR MOST RECENT DIASTOLIC BLOOD PRESSURE < 80 MM HG: ICD-10-PCS | Mod: CPTII,S$GLB,, | Performed by: PHYSICIAN ASSISTANT

## 2022-08-02 PROCEDURE — 3008F BODY MASS INDEX DOCD: CPT | Mod: CPTII,S$GLB,, | Performed by: PHYSICIAN ASSISTANT

## 2022-08-02 RX ORDER — NORTRIPTYLINE HYDROCHLORIDE 10 MG/1
20 CAPSULE ORAL NIGHTLY
Qty: 60 CAPSULE | Refills: 11 | Status: SHIPPED | OUTPATIENT
Start: 2022-08-02 | End: 2023-06-07 | Stop reason: SDUPTHER

## 2022-08-02 NOTE — PROGRESS NOTES
[facility]  [facility]  [address]  [place] [address]  Phone:[phone]  Fax: [phone]  Follow up visit       Patient Name: Eddie Light  : 1995  MRN:  [id number]  Today: 2022   LOV: 2022  chief complaint: Headache    PCP: Calixto Reyes MD.       Assessment:   Eddie Light is a 27 y.o. right handed male with a PMHx of: epilepsy, ADHD, and HA  whom presents with his father in follow up for HA. BELLO appear to be chronic migrainous, worsened (possibly) by lack of sleep. Better since since starting TCA.       Review:    ICD-10-CM ICD-9-CM   1. Chronic migraine without aura without status migrainosus, not intractable  G43.709 346.70   2. Trouble in sleeping  G47.9 780.50   3. Seizure disorder  G40.909 345.90         Plan:   Continue to track HA     For HA Prevention:  1 no change to AED regimen, will defer to Dr. Rowell  2 No BB/CCB d/t hypotension   3 stop elavil at 10 mg and try pamelor at 10 mg for a few days then move up to 20 mg Q2h hours before bed, reaffirmed adv effects/dosing, he agreed try        -if that doesn't help, may consider cGRP or Botox     For HA :  1 stop excedrin  2 use maxalt instead (written at last visit), reaffirmed dosing, he voiced agreement     To break up Headaches:  Can consider nerve blocks (not discussed)    Other:  Follow up with Dr. Rowell for seizures, helped him secure an appoint with Dr. Rowell's staff           All test results as well as any necessary instructions were reviewed and discussed with patient.    Review:  Orders Placed This Encounter    nortriptyline (PAMELOR) 10 MG capsule         Patient to return to PCP/other specialists for all other problems  Patient to continue on all medications as Rx'd  Full office note available online for patient review in secured portal   RTO- 3 months   The patient indicates understanding of these issues and agrees to the plan.    HPI:   Eddie Light is  "a 27 y.o.right handed, male with a PMHx of: epilepsy, ADHD, and HA  whom presents with his father in follow up for HA.     At last visit: continue on AED regimen per Dr. Rowell, no BP meds d/t hypotension, started elavil for HA preventative and maxalt for HA .    Took excedrin for some of his HA, as it was "closer to reach"   maxalt works well and no side effects  15-20 HD a month, historically were 30 HD/30  Average 3-5 hours of pain, historically 11 hours+ of pain   Elavil at 10 mg, does make him tired but has helped with HA, never tried 20 mg   Father has questions about seizures and would like him back in with Dr. Rowell ("Does he need to have that sleep study still?")    NO recent (as in in past 2-3 months) seizures  Tolerating all medicines   No other complaints     From previous visits:   HA HPI:  Start:for many years, has not changed much, father mentions "he has had HA since a child"   History of trauma (no), History of CNS infection (no), History of Stroke (no)  Location: pain could be anywhere, however, when he has the bad HA/migraines, start over the Left frontal and then further radiate to whole head   Severity: range: 1-8/10  Duration:11 hours  Frequency: daily HA, 30 HD/30  Associated factors (bolded positive) WITH HA ( or migraine): Nausea, vomiting, photophobia, phonophobia, tinnitus, scalp pain, vision loss, diplopia, scintillations, eye pain, jaw pain, weakness?    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      cholecalciferol, vitamin D3, 10 mcg (400 unit) Chew Take 1 capsule by mouth once daily.      levetiracetam XR (KEPPRA XR) 500 mg Tb24 24 hr tablet Take 3 tablets (1,500 mg total) by mouth once daily. 90 tablet 0    multivitamin (THERAGRAN) per tablet Take 1 tablet by mouth once daily.      rizatriptan (MAXALT-MLT) 10 MG disintegrating tablet Dissolve 1 melt by mouth at onset at headache, second melt 2 hours later if needed, no more than 2 melts day/ 3days use in week 12 tablet 5     No current facility-administered medications for this visit.     Review of patient's allergies indicates:  No Known Allergies    PMHx:  Past Medical History:   Diagnosis Date    ADHD (attention deficit hyperactivity disorder)     Headache(784.0)     Nevus     seen by Derm    Otitis media     Seizures 2006    x 1    Short stature for age     Dr. Elena Garcia    Strep throat     Wears glasses     Dr. Lara     Past Surgical History:   Procedure Laterality Date    HERNIA REPAIR      TYMPANOSTOMY TUBE PLACEMENT         Fhx:  Family History   Problem Relation Age of Onset    Hypertension Maternal Grandmother     Cancer Maternal Grandmother     Heart disease Maternal Grandfather     COPD Paternal Grandmother     Hypertension Paternal Grandmother     COPD Paternal Grandfather     Hypertension Paternal Grandfather        Shx: background work for [employer], will be in [employer]   Social History     Socioeconomic History    Marital status: Single   Tobacco Use    Smoking status: Never Smoker    Smokeless tobacco: Never Used   Substance and Sexual Activity    Alcohol use: Not Currently      Alcohol/week: 1.0 standard drink     Types: 1 Standard drinks or equivalent per week     Comment: very rare    Drug use: No    Sexual activity: Not Currently     Partners: Female           Labs:   CMP  Sodium   Date Value Ref Range Status   05/02/2022 140 136 - 145 mmol/L Final     Potassium   Date Value Ref Range Status   05/02/2022 4.1 3.5 - 5.1 mmol/L Final     Chloride   Date Value Ref Range Status   05/02/2022 105 95 - 110 mmol/L Final     CO2   Date Value Ref Range Status   05/02/2022 27 23 - 29 mmol/L Final     Glucose   Date Value Ref Range Status   05/02/2022 86 70 - 110 mg/dL Final     BUN   Date Value Ref Range Status   05/02/2022 11 6 - 20 mg/dL Final     Creatinine   Date Value Ref Range Status   05/02/2022 0.9 0.5 - 1.4 mg/dL Final     Calcium   Date Value Ref Range Status   05/02/2022 10.0 8.7 - 10.5 mg/dL Final     Total Protein   Date Value Ref Range Status   05/02/2022 7.5 6.0 - 8.4 g/dL Final     Albumin   Date Value Ref Range Status   05/02/2022 4.6 3.5 - 5.2 g/dL Final     Total Bilirubin   Date Value Ref Range Status   05/02/2022 1.1 (H) 0.1 - 1.0 mg/dL Final     Comment:     For infants and newborns, interpretation of results should be based  on gestational age, weight and in agreement with clinical  observations.    Premature Infant recommended reference ranges:  Up to 24 hours.............<8.0 mg/dL  Up to 48 hours............<12.0 mg/dL  3-5 days..................<15.0 mg/dL  6-29 days.................<15.0 mg/dL       Alkaline Phosphatase   Date Value Ref Range Status   05/02/2022 69 55 - 135 U/L Final     AST   Date Value Ref Range Status   05/02/2022 22 10 - 40 U/L Final     ALT   Date Value Ref Range Status   05/02/2022 18 10 - 44 U/L Final     Anion Gap   Date Value Ref Range Status   05/02/2022 8 8 - 16 mmol/L Final     eGFR if    Date Value Ref Range Status   05/02/2022 >60.0 >60 mL/min/1.73 m^2 Final     eGFR if non    Date Value Ref Range  "Status   2022 >60.0 >60 mL/min/1.73 m^2 Final     Comment:     Calculation used to obtain the estimated glomerular filtration  rate (eGFR) is the CKD-EPI equation.        Lab Results   Component Value Date    WBC 5.56 2022    HGB 15.1 2022    HCT 46.4 2022    MCV 90 2022     2022           Imagin/3/2020 MRI Brain (report):    1. No acute intracranial abnormality.  2. 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       Other testin2022 EEG (Report): This is a normal EEG in an awake and drowsy adult. No potentially epileptiform activity was seen. Please be aware that a normal EEG does not exclude the possibility of an underlying seizure disorder.    Note: I have independently reviewed any/all imaging/labs/tests and agree with the report (s) as documented.  Any discrepancies will be as noted/demarcated by free text.  LANI IBANEZ 2022                     ROS:   Review Of Systems (questions asked, positive or additions in BOLD)  Gen: Weight change, fatigue/malaise, pyrexia   HEENT: Tinnitus, headache,  blurred vision, eye pain, diplopia, photophobia,  nose bleeds, congestion, sore throat, jaw pain, scalp pain, neck stiffness   Card: Palpitations, CP   Pulm: SOB, cough   Vas: Easy bruising, easy bleeding   GI: N/V/D/C, incontinence, hematemesis, hematochezia    : incontinence, hematuria   Endocrine: Temp intolerance, polyuria, polydipsia   M/S: Neck pain, myalgia, back pain, joint pain, falls    Neuro: PER HPI   PSY: Memory loss, confusion, depression, anxiety, trouble in sleep          EXAM:   /79 (BP Location: Left arm, Patient Position: Sitting, BP Method: Medium (Automatic))   Pulse 87   Resp 17   Ht 5' 7" (1.702 m)   Wt 59.4 kg (131 lb)   BMI 20.52 kg/m²    GEN:  NAD  HEENT: Atraumatic, nares patent      EXTREM:   no edema present.    NEURO:  Mental Status:  Awake, alert and " appropriately oriented to time, place, and person.  Normal attention and concentration.  Speech is fluent and appropriate language function (I.e., comprehension)    Cranial Nerves:    Extraocular movements are intact and without nystagmus.  Visual fields are full to confrontation testing.   Facial movement is symmetric.    Hearing is normal. Uvula in midline. FROM of neck in all (6) directions, shoulder shrug symmetrical. Tongue in midline without fasiculation.     Motor:  Antigravity in all limbs   Tremor/pronator drift not apparent.     Gait and Stance: Normal manner of stance and gait function testing.          This document has been electronically signed by  Patel LAURA Mast MPA, PA-C on 8/2/2022, I have personally typed this message using the EMR.       Dr Chinmay MD was available during today's visit.     Personal Protective Equipment:    Personal Protective Equipment was used during this encounter including: KN95 and non latex gloves.          CC: Calixto Reyes MD/Waldemar Rowell MD

## 2022-08-02 NOTE — TELEPHONE ENCOUNTER
Pt is scheduled to f/u on 9/29/22. pt had routine EEG done on 5/4/22. Per last note, would consider ambulatory EEG after routine. Would you like to to have ambulatory done with [facility] or does pt need to be admitted to EMU?

## 2022-09-01 RX ORDER — LEVETIRACETAM 500 MG/1
1500 TABLET, EXTENDED RELEASE ORAL DAILY
Qty: 90 TABLET | Refills: 0 | Status: SHIPPED | OUTPATIENT
Start: 2022-09-01 | End: 2022-10-03 | Stop reason: SDUPTHER

## 2022-09-29 ENCOUNTER — OFFICE VISIT (OUTPATIENT)
Dept: NEUROLOGY | Facility: CLINIC | Age: 27
End: 2022-09-29
Payer: COMMERCIAL

## 2022-09-29 VITALS
DIASTOLIC BLOOD PRESSURE: 73 MMHG | WEIGHT: 125.88 LBS | SYSTOLIC BLOOD PRESSURE: 108 MMHG | BODY MASS INDEX: 19.72 KG/M2 | HEART RATE: 70 BPM | RESPIRATION RATE: 18 BRPM

## 2022-09-29 DIAGNOSIS — R63.0 DECREASED APPETITE: ICD-10-CM

## 2022-09-29 DIAGNOSIS — G40.909 SEIZURE DISORDER: Primary | ICD-10-CM

## 2022-09-29 PROCEDURE — 3078F DIAST BP <80 MM HG: CPT | Mod: CPTII,S$GLB,, | Performed by: PSYCHIATRY & NEUROLOGY

## 2022-09-29 PROCEDURE — 3078F PR MOST RECENT DIASTOLIC BLOOD PRESSURE < 80 MM HG: ICD-10-PCS | Mod: CPTII,S$GLB,, | Performed by: PSYCHIATRY & NEUROLOGY

## 2022-09-29 PROCEDURE — 3074F PR MOST RECENT SYSTOLIC BLOOD PRESSURE < 130 MM HG: ICD-10-PCS | Mod: CPTII,S$GLB,, | Performed by: PSYCHIATRY & NEUROLOGY

## 2022-09-29 PROCEDURE — 3008F PR BODY MASS INDEX (BMI) DOCUMENTED: ICD-10-PCS | Mod: CPTII,S$GLB,, | Performed by: PSYCHIATRY & NEUROLOGY

## 2022-09-29 PROCEDURE — 99999 PR PBB SHADOW E&M-EST. PATIENT-LVL III: ICD-10-PCS | Mod: PBBFAC,,, | Performed by: PSYCHIATRY & NEUROLOGY

## 2022-09-29 PROCEDURE — 3008F BODY MASS INDEX DOCD: CPT | Mod: CPTII,S$GLB,, | Performed by: PSYCHIATRY & NEUROLOGY

## 2022-09-29 PROCEDURE — 99999 PR PBB SHADOW E&M-EST. PATIENT-LVL III: CPT | Mod: PBBFAC,,, | Performed by: PSYCHIATRY & NEUROLOGY

## 2022-09-29 PROCEDURE — 3074F SYST BP LT 130 MM HG: CPT | Mod: CPTII,S$GLB,, | Performed by: PSYCHIATRY & NEUROLOGY

## 2022-09-29 PROCEDURE — 99213 OFFICE O/P EST LOW 20 MIN: CPT | Mod: S$GLB,,, | Performed by: PSYCHIATRY & NEUROLOGY

## 2022-09-29 PROCEDURE — 99213 PR OFFICE/OUTPT VISIT, EST, LEVL III, 20-29 MIN: ICD-10-PCS | Mod: S$GLB,,, | Performed by: PSYCHIATRY & NEUROLOGY

## 2022-09-29 NOTE — PROGRESS NOTES
Date of service:  2022    Chief complaint:  Seizures    Interval history:  The patient is a 27 y.o. male seen previously for epilepsy.  I last saw him in .  Since he was last here, he reports no interval seizures.  His last seizure occurred on 20.  He reports no clear side effects from his Keppra.    He has established in headache clinic.  They report that they are working to find a means of improving these.    They also are concerned about his poor appetite and weight loss.    Prior history:  The patient is a 27 y.o. male seen previously in for seizures, last in .  Previously, we tried transitioning him from Zonegran to Lamictal.  He apparently had significant drowsiness on this, so we then transitioned to Keppra.  When I saw him in , no side effects were reported.  He returns today reporting no further seizures.  He does report some morning fatigue which improves over the course of the day.  He wonders if this might be related to his Keppra.  He endorses getting at least 8 hours of sleep at night.  He denies interrupted/disrupted sleep.  He has no history of snoring.  He has never had witnessed sleep apnea.    History of present illness:  The patient is a 27 y.o. male referred for evaluation of episodes suspicious for seizures. These began at about age 10-12.  The patient has no consistent warning, though he says that he has seen a red light before one event.  His seizure is characterized by generalized stiffening and shaking.  There is a history of tongue biting.  This component of this spell lasts for approximately 2 minutes.  Afterwards, he reports confusion/fatigue.  The patient's frequency of events is roughly once a year.    The patient has no family history of seizures.  He reports a history of prenatal/ complications with the patient having been born prematurely. There is no history of febrile seizures.  He notes no history of CNS infections. He claims no history of  significant head trauma. There is a history of developmental abnormalities on his MRI of the brain as described below.    Current AEDs:  Keppra XR 1000 mg QHS    Prior AEDs:  Phenobarbital (lethargy)  Zonegran (drowsiness)  Lamictal (drowsiness)      Past Medical History:   Diagnosis Date    ADHD (attention deficit hyperactivity disorder)     Headache(784.0)     Nevus     seen by Derm    Otitis media     Seizures 2006    x 1    Short stature for age     Dr. Elena Garcia    Strep throat     Wears glasses     Dr. Lara       Past Surgical History:   Procedure Laterality Date    HERNIA REPAIR      TYMPANOSTOMY TUBE PLACEMENT         Family history:  Family History   Problem Relation Age of Onset    Hypertension Maternal Grandmother     Cancer Maternal Grandmother     Heart disease Maternal Grandfather     COPD Paternal Grandmother     Hypertension Paternal Grandmother     COPD Paternal Grandfather     Hypertension Paternal Grandfather      No reported FH of seizures      Social history:  Social History     Socioeconomic History    Marital status: Single   Tobacco Use    Smoking status: Never    Smokeless tobacco: Never   Substance and Sexual Activity    Alcohol use: Not Currently     Alcohol/week: 1.0 standard drink     Types: 1 Standard drinks or equivalent per week     Comment: very rare    Drug use: No    Sexual activity: Not Currently     Partners: Female   No reported T/E/D     Review of Systems  A comprehensive ROS was previously performed.  It is not significantly changed except as noted above.     Physical exam:  /73 (BP Location: Right arm, Patient Position: Sitting, BP Method: Medium (Automatic))   Pulse 70   Resp 18   Wt 57.1 kg (125 lb 14.1 oz)   BMI 19.72 kg/m²   General: Well developed, well nourished.  No acute distress.  HEENT: Atraumatic, normocephalic.  Neck: Supple, trachea midline.  Cardiovascular: Regular rate and rhythm.  Pulmonary: No increased work of breathing.  Abdomen/GI:  "No guarding.  Musculoskeletal: No obvious joint deformities, moves all extremities well.    Neurological exam:   Mental status: Awake and alert.  Oriented x4.  Speech fluent and appropriate.  Recent and remote memory appear to be grossly intact.  Fund of knowledge grossly normal.  Cranial nerves: Pupils equal round and reactive to light, extraocular movements intact, facial strength and sensation intact bilaterally, palate and tongue midline, hearing grossly intact bilaterally.  Motor: 5 out of 5 strength throughout the upper and lower extremities bilaterally. Normal bulk and tone.  Sensation: Intact to light touch and temperature bilaterally.  DTR: 2+ at the knees and biceps bilaterally.  Coordination: Finger-nose-finger testing intact bilaterally.  Gait: Normal gait. Good tandem.    Data base:    Labs:  Labs checked at our last visit include a bilirubin of 1.1.    MRI brain (2012):  "1. 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  2. 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 This is not optimally evaluated secondary to motion artifact and metal artifact within the mouth.  3. Dysgenesis of the corpus callosum.  4. Fluid within right mastoid air cells. Please correlate clinically for symptoms of mastoiditis."    EEG (11/15):  "Normal EEG"    EEG (5/22):  Normal    Assessment and plan:  The patient is a 27 y.o. male referred for evaluation for seizures. 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  As far as medications go, we will continue his current regimen for now.  We will consider neuropsychological evaluation.  State law as it pertains to driving for individuals with seizures has been discussed.  The patient has also been counseled on seizure safety.      He is to continue working on his appetite with his PCP.    We will plan on seeing the patient back in a few " months.

## 2022-10-03 RX ORDER — LEVETIRACETAM 500 MG/1
1500 TABLET, EXTENDED RELEASE ORAL DAILY
Qty: 90 TABLET | Refills: 0 | Status: SHIPPED | OUTPATIENT
Start: 2022-10-03 | End: 2022-11-02 | Stop reason: SDUPTHER

## 2022-10-24 ENCOUNTER — OFFICE VISIT (OUTPATIENT)
Dept: FAMILY MEDICINE | Facility: CLINIC | Age: 27
End: 2022-10-24
Payer: COMMERCIAL

## 2022-10-24 VITALS
DIASTOLIC BLOOD PRESSURE: 78 MMHG | OXYGEN SATURATION: 96 % | HEIGHT: 67 IN | WEIGHT: 125.88 LBS | RESPIRATION RATE: 18 BRPM | HEART RATE: 88 BPM | SYSTOLIC BLOOD PRESSURE: 96 MMHG | BODY MASS INDEX: 19.76 KG/M2

## 2022-10-24 DIAGNOSIS — Z23 NEEDS FLU SHOT: ICD-10-CM

## 2022-10-24 DIAGNOSIS — G43.809 OTHER MIGRAINE WITHOUT STATUS MIGRAINOSUS, NOT INTRACTABLE: ICD-10-CM

## 2022-10-24 DIAGNOSIS — Z71.3 NUTRITIONAL COUNSELING: ICD-10-CM

## 2022-10-24 DIAGNOSIS — R53.83 FATIGUE, UNSPECIFIED TYPE: ICD-10-CM

## 2022-10-24 DIAGNOSIS — G40.209 LOCALIZATION-RELATED (FOCAL) (PARTIAL) SYMPTOMATIC EPILEPSY AND EPILEPTIC SYNDROMES WITH COMPLEX PARTIAL SEIZURES, NOT INTRACTABLE, WITHOUT STATUS EPILEPTICUS: Primary | Chronic | ICD-10-CM

## 2022-10-24 PROBLEM — G43.909 MIGRAINE HEADACHE: Status: ACTIVE | Noted: 2022-10-24

## 2022-10-24 PROCEDURE — 90686 FLU VACCINE (QUAD) GREATER THAN OR EQUAL TO 3YO PRESERVATIVE FREE IM: ICD-10-PCS | Mod: S$GLB,,, | Performed by: STUDENT IN AN ORGANIZED HEALTH CARE EDUCATION/TRAINING PROGRAM

## 2022-10-24 PROCEDURE — 3078F DIAST BP <80 MM HG: CPT | Mod: CPTII,S$GLB,, | Performed by: STUDENT IN AN ORGANIZED HEALTH CARE EDUCATION/TRAINING PROGRAM

## 2022-10-24 PROCEDURE — 99999 PR PBB SHADOW E&M-EST. PATIENT-LVL IV: CPT | Mod: PBBFAC,,, | Performed by: STUDENT IN AN ORGANIZED HEALTH CARE EDUCATION/TRAINING PROGRAM

## 2022-10-24 PROCEDURE — 90471 IMMUNIZATION ADMIN: CPT | Mod: S$GLB,,, | Performed by: STUDENT IN AN ORGANIZED HEALTH CARE EDUCATION/TRAINING PROGRAM

## 2022-10-24 PROCEDURE — 90471 FLU VACCINE (QUAD) GREATER THAN OR EQUAL TO 3YO PRESERVATIVE FREE IM: ICD-10-PCS | Mod: S$GLB,,, | Performed by: STUDENT IN AN ORGANIZED HEALTH CARE EDUCATION/TRAINING PROGRAM

## 2022-10-24 PROCEDURE — 3074F SYST BP LT 130 MM HG: CPT | Mod: CPTII,S$GLB,, | Performed by: STUDENT IN AN ORGANIZED HEALTH CARE EDUCATION/TRAINING PROGRAM

## 2022-10-24 PROCEDURE — 3074F PR MOST RECENT SYSTOLIC BLOOD PRESSURE < 130 MM HG: ICD-10-PCS | Mod: CPTII,S$GLB,, | Performed by: STUDENT IN AN ORGANIZED HEALTH CARE EDUCATION/TRAINING PROGRAM

## 2022-10-24 PROCEDURE — 99999 PR PBB SHADOW E&M-EST. PATIENT-LVL IV: ICD-10-PCS | Mod: PBBFAC,,, | Performed by: STUDENT IN AN ORGANIZED HEALTH CARE EDUCATION/TRAINING PROGRAM

## 2022-10-24 PROCEDURE — 1159F MED LIST DOCD IN RCRD: CPT | Mod: CPTII,S$GLB,, | Performed by: STUDENT IN AN ORGANIZED HEALTH CARE EDUCATION/TRAINING PROGRAM

## 2022-10-24 PROCEDURE — 1160F RVW MEDS BY RX/DR IN RCRD: CPT | Mod: CPTII,S$GLB,, | Performed by: STUDENT IN AN ORGANIZED HEALTH CARE EDUCATION/TRAINING PROGRAM

## 2022-10-24 PROCEDURE — 99214 OFFICE O/P EST MOD 30 MIN: CPT | Mod: 25,S$GLB,, | Performed by: STUDENT IN AN ORGANIZED HEALTH CARE EDUCATION/TRAINING PROGRAM

## 2022-10-24 PROCEDURE — 90686 IIV4 VACC NO PRSV 0.5 ML IM: CPT | Mod: S$GLB,,, | Performed by: STUDENT IN AN ORGANIZED HEALTH CARE EDUCATION/TRAINING PROGRAM

## 2022-10-24 PROCEDURE — 1160F PR REVIEW ALL MEDS BY PRESCRIBER/CLIN PHARMACIST DOCUMENTED: ICD-10-PCS | Mod: CPTII,S$GLB,, | Performed by: STUDENT IN AN ORGANIZED HEALTH CARE EDUCATION/TRAINING PROGRAM

## 2022-10-24 PROCEDURE — 1159F PR MEDICATION LIST DOCUMENTED IN MEDICAL RECORD: ICD-10-PCS | Mod: CPTII,S$GLB,, | Performed by: STUDENT IN AN ORGANIZED HEALTH CARE EDUCATION/TRAINING PROGRAM

## 2022-10-24 PROCEDURE — 99214 PR OFFICE/OUTPT VISIT, EST, LEVL IV, 30-39 MIN: ICD-10-PCS | Mod: 25,S$GLB,, | Performed by: STUDENT IN AN ORGANIZED HEALTH CARE EDUCATION/TRAINING PROGRAM

## 2022-10-24 PROCEDURE — 3078F PR MOST RECENT DIASTOLIC BLOOD PRESSURE < 80 MM HG: ICD-10-PCS | Mod: CPTII,S$GLB,, | Performed by: STUDENT IN AN ORGANIZED HEALTH CARE EDUCATION/TRAINING PROGRAM

## 2022-10-24 NOTE — PROGRESS NOTES
Subjective:      Patient ID: Eddie Light is a 27 y.o. male    Chief Complaint   Patient presents with    Headache     Migraines,     Fatigue    Depression     Sx for few months      HPI  27 y.o. male with a PMHx as documented below presents to clinic today for the following:  - Seizure disorder, migraine headaches: Follows w/ Neurology, due to start clinical trial in the next few weeks for Nurtec and Emgality. Pt reports needing to taper off Pamelor - discussed self-taper prior to start of clinical trial.  - Pt reports increased fatigue for the past year. Associated symptoms include decreased appetite. Pt denies feelings of sadness/depression, changes in interests, feelings of guilt, changes in concentration. Labs completed in  5/2022 including CBC, CMP, TSH, B12 all wnl. Discussed diet/lifestyle habits - pt reports sleeping 8-12 hours a night but still feeling tired. Reports good quality sleep. Pt reports usually eating one meal a day, denies snacking. Meal usually take-out. 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 Plays basketball in free time. No medication changes corresponding to onset of fatigue.     Review of Systems   Constitutional:  Negative for chills and fever.   Respiratory:  Negative for shortness of breath.    Cardiovascular:  Negative for chest pain.   Gastrointestinal:  Negative for abdominal pain, constipation, diarrhea, nausea and vomiting.   Genitourinary:  Negative for dysuria.   Musculoskeletal:  Negative for back pain and neck pain.   Skin:  Negative for rash.   Neurological:  Negative for dizziness, sensory change, weakness and headaches.   Psychiatric/Behavioral:  Negative for depression. The patient is not nervous/anxious.      Past Medical History:   Diagnosis Date    ADHD (attention deficit hyperactivity disorder)     Localization-related (focal) (partial) symptomatic epilepsy and epileptic syndromes with complex partial seizures, not intractable, without  status epilepticus 10/09/2019    Migraine headache 10/24/2022    Short stature for age     Dr. Elena Garcia    Wears glasses     Dr. Lara     Objective:      Vitals:    10/24/22 0917   BP: 96/78   Pulse: 88   Resp: 18     Physical Exam  Vitals reviewed.   Constitutional:       General: He is not in acute distress.  HENT:      Head: Normocephalic and atraumatic.   Cardiovascular:      Rate and Rhythm: Normal rate.   Pulmonary:      Effort: Pulmonary effort is normal. No respiratory distress.   Neurological:      General: No focal deficit present.      Mental Status: He is alert and oriented to person, place, and time. Mental status is at baseline.      Assessment:       1. 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 Other migraine without status migrainosus, not intractable    3. Fatigue, unspecified type    4. Nutritional counseling    5. Needs flu shot      Plan:       Eddie was seen today for headache, fatigue and depression.    Diagnoses and all orders for this visit:    Localization-related (focal) (partial) symptomatic epilepsy and epileptic syndromes with complex partial seizures, not intractable, without status epilepticus  Continue Keppra, continue following w/ Neurology.    Other migraine without status migrainosus, not intractable  Follows w/ Neurology, due to start clinical trial in the next few weeks for Nurtec and Emgality. Pt reports needing to taper off Pamelor - discussed self-taper prior to start of clinical trial.   Fatigue, unspecified type  Nutritional counseling  Fatigue possibly related to Keppra and Pamelor for treatment of epilepsy and migraine headaches - monitor symptoms when titrating off Pamelor to see if fatigue improves. Dietary habits likely playing a role as well - counseled on nutrition.     Needs flu shot  -     Influenza - Quadrivalent (PF)          Follow up in about 4 weeks (around 11/21/2022), or if symptoms worsen or fail to improve.    Radha Diaz MD  [facility]  Office: [phone]   Fax: [phone]  10/24/2022    Disclaimer: This note was partly generated using dictation software which may occasionally result in transcription errors.

## 2022-11-02 ENCOUNTER — OFFICE VISIT (OUTPATIENT)
Dept: NEUROLOGY | Facility: CLINIC | Age: 27
End: 2022-11-02
Payer: COMMERCIAL

## 2022-11-02 VITALS
DIASTOLIC BLOOD PRESSURE: 78 MMHG | RESPIRATION RATE: 18 BRPM | WEIGHT: 127.75 LBS | BODY MASS INDEX: 20.01 KG/M2 | SYSTOLIC BLOOD PRESSURE: 125 MMHG | HEART RATE: 76 BPM

## 2022-11-02 DIAGNOSIS — G43.909 MIGRAINE WITHOUT STATUS MIGRAINOSUS, NOT INTRACTABLE, UNSPECIFIED MIGRAINE TYPE: Primary | ICD-10-CM

## 2022-11-02 PROCEDURE — 1159F MED LIST DOCD IN RCRD: CPT | Mod: CPTII,S$GLB,, | Performed by: PHYSICIAN ASSISTANT

## 2022-11-02 PROCEDURE — 3074F SYST BP LT 130 MM HG: CPT | Mod: CPTII,S$GLB,, | Performed by: PHYSICIAN ASSISTANT

## 2022-11-02 PROCEDURE — 3008F PR BODY MASS INDEX (BMI) DOCUMENTED: ICD-10-PCS | Mod: CPTII,S$GLB,, | Performed by: PHYSICIAN ASSISTANT

## 2022-11-02 PROCEDURE — 99213 OFFICE O/P EST LOW 20 MIN: CPT | Mod: S$GLB,,, | Performed by: PHYSICIAN ASSISTANT

## 2022-11-02 PROCEDURE — 99999 PR PBB SHADOW E&M-EST. PATIENT-LVL III: ICD-10-PCS | Mod: PBBFAC,,, | Performed by: PHYSICIAN ASSISTANT

## 2022-11-02 PROCEDURE — 99999 PR PBB SHADOW E&M-EST. PATIENT-LVL III: CPT | Mod: PBBFAC,,, | Performed by: PHYSICIAN ASSISTANT

## 2022-11-02 PROCEDURE — 99213 PR OFFICE/OUTPT VISIT, EST, LEVL III, 20-29 MIN: ICD-10-PCS | Mod: S$GLB,,, | Performed by: PHYSICIAN ASSISTANT

## 2022-11-02 PROCEDURE — 3074F PR MOST RECENT SYSTOLIC BLOOD PRESSURE < 130 MM HG: ICD-10-PCS | Mod: CPTII,S$GLB,, | Performed by: PHYSICIAN ASSISTANT

## 2022-11-02 PROCEDURE — 1159F PR MEDICATION LIST DOCUMENTED IN MEDICAL RECORD: ICD-10-PCS | Mod: CPTII,S$GLB,, | Performed by: PHYSICIAN ASSISTANT

## 2022-11-02 PROCEDURE — 1160F RVW MEDS BY RX/DR IN RCRD: CPT | Mod: CPTII,S$GLB,, | Performed by: PHYSICIAN ASSISTANT

## 2022-11-02 PROCEDURE — 3078F PR MOST RECENT DIASTOLIC BLOOD PRESSURE < 80 MM HG: ICD-10-PCS | Mod: CPTII,S$GLB,, | Performed by: PHYSICIAN ASSISTANT

## 2022-11-02 PROCEDURE — 1160F PR REVIEW ALL MEDS BY PRESCRIBER/CLIN PHARMACIST DOCUMENTED: ICD-10-PCS | Mod: CPTII,S$GLB,, | Performed by: PHYSICIAN ASSISTANT

## 2022-11-02 PROCEDURE — 3078F DIAST BP <80 MM HG: CPT | Mod: CPTII,S$GLB,, | Performed by: PHYSICIAN ASSISTANT

## 2022-11-02 PROCEDURE — 3008F BODY MASS INDEX DOCD: CPT | Mod: CPTII,S$GLB,, | Performed by: PHYSICIAN ASSISTANT

## 2022-11-02 RX ORDER — LEVETIRACETAM 500 MG/1
1500 TABLET, EXTENDED RELEASE ORAL DAILY
Qty: 90 TABLET | Refills: 0 | Status: SHIPPED | OUTPATIENT
Start: 2022-11-02 | End: 2022-12-13 | Stop reason: SDUPTHER

## 2022-11-02 NOTE — PROGRESS NOTES
[facility]  [address] [facility] [address]  [place] [address]  Phone:[phone]  Fax: [phone]  Follow up visit       Patient Name: Eddie Light  : 1995  MRN:  [id number]  Today: 2022   LOV: 2022 c me for HA,  c Dr Rowell  chief complaint: Migraine    PCP: Calixto Reyes MD.       Assessment:   Eddie Light is a 27 y.o. right handed male with a PMHx of: epilepsy, ADHD, and HA  whom presents with his father in follow up for HA. BELLO appear to be chronic migrainous, worsened (possibly) by lack of sleep. Better since since starting TCA.       Review:    ICD-10-CM ICD-9-CM   1. Migraine without status migrainosus, not intractable, unspecified migraine type  G43.909 346.90           Plan:   Continue to track HA     For HA Prevention:  1 no change to AED regimen, will defer to Dr. Rowell  2 No BB/CCB d/t hypotension   3 continue pamelor at 20 mg        -if that doesn't help, may consider cGRP or Botox     For HA :  Maxalt, reaffirmed dosing/adv effects    To break up Headaches:  Can consider nerve blocks (not discussed)    Other:  Defer seizure management to our epilepsy dept [facility] on the [facility]           All test results as well as any necessary instructions were reviewed and discussed with patient.    Review:           Patient to return to PCP/other specialists for all other problems  Patient to continue on all medications as Rx'd  Full office note available online for patient review in secured portal   RTO- 6 months   The patient indicates understanding of these issues and agrees to the plan.    HPI:   Eddie Light is a 27 y.o.right handed, male with a PMHx of: epilepsy, ADHD, and HA  whom presents with his mother in follow up for HA.     At previous visit: he was to continue on same AED, started pamelor, and added maxalt.   Eatiing more   1-2 HD in past month   Still taking pamelor at 20 mg, no side effects      -he  "was going to be in a migraine trial, this apparently stopped/eligibility changed/never head back   Maxalt helps at times will need second pill   Last HA was 3 weeks ago (approx)   Last HA was 7/10, lasted 3 hours   Pain was behind the left eye and left frontalis  + photophobia, phonophobia  No nausea   No other concerns       At last visit: continue on AED regimen per Dr. Rowell, no BP meds d/t hypotension, started elavil for HA preventative and maxalt for HA .    Took excedrin for some of his HA, as it was "closer to reach"   maxalt works well and no side effects  15-20 HD a month, historically were 30 HD/30  Average 3-5 hours of pain, historically 11 hours+ of pain   Elavil at 10 mg, does make him tired but has helped with HA, never tried 20 mg   Father has questions about seizures and would like him back in with Dr. Rowell ("Does he need to have that sleep study still?")    NO recent (as in in past 2-3 months) seizures  Tolerating all medicines   No other complaints     From previous visits:   HA HPI:  Start:for many years, has not changed much, father mentions "he has had HA since a child"   History of trauma (no), History of CNS infection (no), History of Stroke (no)  Location: pain could be anywhere, however, when he has the bad HA/migraines, start over the Left frontal and then further radiate to whole head   Severity: range: 1-8/10  Duration:11 hours  Frequency: daily HA, 30 HD/30  Associated factors (bolded positive) WITH HA ( or migraine): Nausea, vomiting, photophobia, phonophobia, tinnitus, scalp pain, vision loss, diplopia, scintillations, eye pain, jaw pain, weakness?    Tried:excedrin   Triggers (in bold): stress, lack of sleep, too much caffeine, too little caffeine, weather change, seasonal change, strong odours, bright lights, sunlight, food   Currently having a HA?:yes   Positives in bold: Hx of Kidney Stones, asthma, GI bleed, osteoporosis, CAD/MI, CVA/TIA, DM  <---denies   Imaging on file: " 11/3/2020 MRI Brain : agenesis of corpus callosum   Therapies tried in past: (failures to be marked, if known---why did it fail?)  For epilepsy: keppra, phenobarbital, zonegran, lamictal  Excedrin  Elavil  maxalt     Medication Reconciliation:   Current Outpatient Medications   Medication Sig Dispense Refill    cholecalciferol, vitamin D3, 10 mcg (400 unit) Chew Take 1 capsule by mouth once daily.      levetiracetam XR (KEPPRA XR) 500 mg Tb24 24 hr tablet Take 3 tablets (1,500 mg total) by mouth once daily. 90 tablet 0    multivitamin (THERAGRAN) per tablet Take 1 tablet by mouth once daily.      nortriptyline (PAMELOR) 10 MG capsule Take 2 capsules (20 mg total) by mouth 2 hours before bedtime 60 capsule 11    rizatriptan (MAXALT-MLT) 10 MG disintegrating tablet Dissolve 1 melt by mouth at onset at headache, second melt 2 hours later if needed, no more than 2 melts day/ 3days use in week 12 tablet 5     No current facility-administered medications for this visit.     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 Elena Garcia    Wears glasses     Dr. Lara     Past Surgical History:   Procedure Laterality Date    HERNIA REPAIR      TYMPANOSTOMY TUBE PLACEMENT         Fhx:  Family History   Problem Relation Age of Onset    Hypertension Maternal Grandmother     Cancer Maternal Grandmother     Heart disease Maternal Grandfather     COPD Paternal Grandmother     Hypertension Paternal Grandmother     COPD Paternal Grandfather     Hypertension Paternal Grandfather        Shx: background work for [employer], will be in [employer]   Social History      Socioeconomic History    Marital status: Single   Tobacco Use    Smoking status: Never    Smokeless tobacco: Never   Substance and Sexual Activity    Alcohol use: Not Currently     Alcohol/week: 1.0 standard drink     Types: 1 Standard drinks or equivalent per week     Comment: very rare    Drug use: No    Sexual activity: Not Currently     Partners: Female           Labs:   CMP  Sodium   Date Value Ref Range Status   05/02/2022 140 136 - 145 mmol/L Final     Potassium   Date Value Ref Range Status   05/02/2022 4.1 3.5 - 5.1 mmol/L Final     Chloride   Date Value Ref Range Status   05/02/2022 105 95 - 110 mmol/L Final     CO2   Date Value Ref Range Status   05/02/2022 27 23 - 29 mmol/L Final     Glucose   Date Value Ref Range Status   05/02/2022 86 70 - 110 mg/dL Final     BUN   Date Value Ref Range Status   05/02/2022 11 6 - 20 mg/dL Final     Creatinine   Date Value Ref Range Status   05/02/2022 0.9 0.5 - 1.4 mg/dL Final     Calcium   Date Value Ref Range Status   05/02/2022 10.0 8.7 - 10.5 mg/dL Final     Total Protein   Date Value Ref Range Status   05/02/2022 7.5 6.0 - 8.4 g/dL Final     Albumin   Date Value Ref Range Status   05/02/2022 4.6 3.5 - 5.2 g/dL Final     Total Bilirubin   Date Value Ref Range Status   05/02/2022 1.1 (H) 0.1 - 1.0 mg/dL Final     Comment:     For infants and newborns, interpretation of results should be based  on gestational age, weight and in agreement with clinical  observations.    Premature Infant recommended reference ranges:  Up to 24 hours.............<8.0 mg/dL  Up to 48 hours............<12.0 mg/dL  3-5 days..................<15.0 mg/dL  6-29 days.................<15.0 mg/dL       Alkaline Phosphatase   Date Value Ref Range Status   05/02/2022 69 55 - 135 U/L Final     AST   Date Value Ref Range Status   05/02/2022 22 10 - 40 U/L Final     ALT   Date Value Ref Range Status   05/02/2022 18 10 - 44 U/L Final     Anion Gap   Date Value Ref Range Status   05/02/2022 8 8 - 16  mmol/L Final     eGFR if    Date Value Ref Range Status   2022 >60.0 >60 mL/min/1.73 m^2 Final     eGFR if non    Date Value Ref Range Status   2022 >60.0 >60 mL/min/1.73 m^2 Final     Comment:     Calculation used to obtain the estimated glomerular filtration  rate (eGFR) is the CKD-EPI equation.        Lab Results   Component Value Date    WBC 5.56 2022    HGB 15.1 2022    HCT 46.4 2022    MCV 90 2022     2022           Imagin/3/2020 MRI Brain (report):    1. No acute intracranial abnormality.  2. 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       Other testin2022 EEG (Report): This is a normal EEG in an awake and drowsy adult. No potentially epileptiform activity was seen. Please be aware that a normal EEG does not exclude the possibility of an underlying seizure disorder.    Note: I have independently reviewed any/all imaging/labs/tests and agree with the report (s) as documented.  Any discrepancies will be as noted/demarcated by free text.  LANI IBANEZ 2022                     ROS:   Review Of Systems (questions asked, positive or additions in BOLD)  Gen: Weight change, fatigue/malaise, pyrexia   HEENT: Tinnitus, headache,  blurred vision, eye pain, diplopia, photophobia,  nose bleeds, congestion, sore throat, jaw pain, scalp pain, neck stiffness   Card: Palpitations, CP   Pulm: SOB, cough   Vas: Easy bruising, easy bleeding   GI: N/V/D/C, incontinence, hematemesis, hematochezia    : incontinence, hematuria   Endocrine: Temp intolerance, polyuria, polydipsia   M/S: Neck pain, myalgia, back pain, joint pain, falls    Neuro: PER HPI   PSY: Memory loss, confusion, depression, anxiety, trouble in sleep          EXAM:   Remains similar to previous visits   /78 (BP Location: Left arm, Patient Position: Sitting, BP Method: Medium (Automatic))    Pulse 76   Resp 18   Wt 58 kg (127 lb 12.1 oz)   BMI 20.01 kg/m²    GEN:  NAD  HEENT: Atraumatic, nares patent      EXTREM:   no edema present.    NEURO:  Mental Status:  Awake, alert and appropriately oriented to time, place, and person.  Normal attention and concentration.  Speech is fluent and appropriate language function (I.e., comprehension)    Cranial Nerves:    Extraocular movements are intact and without nystagmus.  Visual fields are full to confrontation testing.   Facial movement is symmetric.    Hearing is normal. Uvula in midline. FROM of neck in all (6) directions, shoulder shrug symmetrical. Tongue in midline without fasiculation.     Motor:  Antigravity in all limbs   Tremor/pronator drift not apparent.     Gait and Stance: Normal manner of stance and gait function testing.          This document has been electronically signed by Mr. Patel Mast MPA, PA-C on 11/2/2022, I have personally typed this message using the EMR.       Dr Chinmay MD was available during today's visit.     Personal Protective Equipment:    Personal Protective Equipment was used during this encounter including: surgical mask and non latex gloves.          CC: Calixto Reyes MD/Waldemar Rowell MD

## 2022-12-13 DIAGNOSIS — G40.909 SEIZURE DISORDER: Primary | ICD-10-CM

## 2022-12-13 RX ORDER — LEVETIRACETAM 500 MG/1
1500 TABLET, EXTENDED RELEASE ORAL DAILY
Qty: 270 TABLET | Refills: 2 | Status: SHIPPED | OUTPATIENT
Start: 2022-12-13 | End: 2023-08-23 | Stop reason: SDUPTHER

## 2023-02-15 DIAGNOSIS — G43.709 CHRONIC MIGRAINE WITHOUT AURA WITHOUT STATUS MIGRAINOSUS, NOT INTRACTABLE: ICD-10-CM

## 2023-02-15 RX ORDER — RIZATRIPTAN BENZOATE 10 MG/1
TABLET, ORALLY DISINTEGRATING ORAL
Qty: 12 TABLET | Refills: 5 | Status: SHIPPED | OUTPATIENT
Start: 2023-02-15

## 2023-06-07 RX ORDER — NORTRIPTYLINE HYDROCHLORIDE 10 MG/1
20 CAPSULE ORAL NIGHTLY
Qty: 60 CAPSULE | Refills: 1 | Status: SHIPPED | OUTPATIENT
Start: 2023-06-07 | End: 2023-08-22 | Stop reason: SDUPTHER

## 2023-06-27 ENCOUNTER — PATIENT MESSAGE (OUTPATIENT)
Dept: FAMILY MEDICINE | Facility: CLINIC | Age: 28
End: 2023-06-27
Payer: COMMERCIAL

## 2023-08-22 RX ORDER — NORTRIPTYLINE HYDROCHLORIDE 10 MG/1
20 CAPSULE ORAL NIGHTLY
Qty: 60 CAPSULE | Refills: 1 | Status: SHIPPED | OUTPATIENT
Start: 2023-08-22

## 2023-08-23 ENCOUNTER — TELEPHONE (OUTPATIENT)
Dept: NEUROLOGY | Facility: CLINIC | Age: 28
End: 2023-08-23
Payer: COMMERCIAL

## 2023-08-23 DIAGNOSIS — G40.909 SEIZURE DISORDER: ICD-10-CM

## 2023-08-23 RX ORDER — LEVETIRACETAM 500 MG/1
1500 TABLET, EXTENDED RELEASE ORAL DAILY
Qty: 270 TABLET | Refills: 1 | Status: SHIPPED | OUTPATIENT
Start: 2023-08-23 | End: 2024-02-25 | Stop reason: SDUPTHER

## 2023-12-07 ENCOUNTER — OFFICE VISIT (OUTPATIENT)
Dept: URGENT CARE | Facility: CLINIC | Age: 28
End: 2023-12-07

## 2023-12-07 VITALS
SYSTOLIC BLOOD PRESSURE: 117 MMHG | WEIGHT: 127 LBS | BODY MASS INDEX: 19.93 KG/M2 | RESPIRATION RATE: 16 BRPM | OXYGEN SATURATION: 99 % | DIASTOLIC BLOOD PRESSURE: 85 MMHG | HEIGHT: 67 IN | TEMPERATURE: 98 F | HEART RATE: 100 BPM

## 2023-12-07 DIAGNOSIS — U07.1 COVID-19: Primary | ICD-10-CM

## 2023-12-07 DIAGNOSIS — R09.81 SINUS CONGESTION: ICD-10-CM

## 2023-12-07 LAB
CTP QC/QA: YES
SARS-COV-2 AG RESP QL IA.RAPID: POSITIVE

## 2023-12-07 PROCEDURE — 99214 OFFICE O/P EST MOD 30 MIN: CPT | Mod: TIER,S$GLB,, | Performed by: PHYSICIAN ASSISTANT

## 2023-12-07 PROCEDURE — 87811 SARS CORONAVIRUS 2 ANTIGEN POCT, MANUAL READ: ICD-10-PCS | Mod: QW,TIER,S$GLB, | Performed by: PHYSICIAN ASSISTANT

## 2023-12-07 PROCEDURE — 99214 PR OFFICE/OUTPT VISIT, EST, LEVL IV, 30-39 MIN: ICD-10-PCS | Mod: TIER,S$GLB,, | Performed by: PHYSICIAN ASSISTANT

## 2023-12-07 PROCEDURE — 87811 SARS-COV-2 COVID19 W/OPTIC: CPT | Mod: QW,TIER,S$GLB, | Performed by: PHYSICIAN ASSISTANT

## 2023-12-07 NOTE — PATIENT INSTRUCTIONS
Your test was POSITIVE for COVID-19 (coronavirus).       Please isolate yourself at home.  You may leave home and/or return to work once the following conditions are met:    If you were not hospitalized and are not moderately to severely immunocompromised:   More than 5 days since symptoms first appeared AND  More than 24 hours fever free without medications AND  Symptoms are improving  Continue to wear a mask around others for 5 additional days.    If you were hospitalized OR are moderately to severely immunocompromised:  More than 20 days since symptoms first appeared  More than 24 hours fever free without medications  Symptoms have improved    If you had no symptoms but tested positive:  More than 5 days since the date of the first positive test (20 days if moderately to severely immunocompromised). If you develop symptoms, then use the guidelines above.  Continue to wear a mask around others for 5 additional days.      Contact Tracing    As one of the next steps, you will receive a call or text from the [place] Department of Health ([facility]) COVID-19 Tracing Team. See the contact information below so you know not to ignore the health departments call. It is important that you contact them back immediately so they can help.      Contact Tracer Number:  [phone]  Caller ID for most carriers: [facility]     What is contact tracing?  Contact tracing is a process that helps identify everyone who has been in close contact with an infected person. Contact tracers let those people know they may have been exposed and guide them on next steps. Confidentiality is important for everyone; no one will be told who may have exposed them to the virus.  Your involvement is important. The more we know about where and how this virus is spreading, the better chance we have at stopping it from spreading further.  What does exposure mean?  Exposure means you have been within 6 feet for more than 15 minutes with a person who  has or had COVID-19.  What kind of questions do the contact tracers ask?  A contact tracer will confirm your basic contact information including name, address, phone number, and next of kin, as well as asking about any symptoms you may have had. Theyll also ask you how you think you may have gotten sick, such as places where you may have been exposed to the virus, and people you were with. Those names will never be shared with anyone outside of that call, and will only be used to help trace and stop the spread of the virus.   I have privacy concerns. How will the state use my information?  Your privacy about your health is important. All calls are completed using call centers that use the appropriate health privacy protection measures (HIPAA compliance), meaning that your patient information is safe. No one will ever ask you any questions related to immigration status. Your health comes first.   Do I have to participate?  You do not have to participate, but we strongly encourage you to. Contact tracing can help us catch and control new outbreaks as theyre developing to keep your friends and family safe.   What if I dont hear from anyone?  If you dont receive a call within 24 hours, you can call the number above right away to inquire about your status. That line is open from 8:00 am - 8:00 p.m., 7 days a week.  Contact tracing saves lives! Together, we have the power to beat this virus and keep our loved ones and neighbors safe.    For more information see CDC link below.      https://www.cdc.gov/coronavirus/2019-ncov/hcp/guidance-prevent-spread.html#precautions        Sources:  [facility], [place] [facility] and [facility]           Sincerely,     ABHINAV Peters

## 2023-12-07 NOTE — LETTER
[address] [address], [address] ? [place] [address] ? Phone [phone] ? Fax [phone]           Return to Work/School    Patient: Eddie Light  YOB: 1995   Date: 12/07/2023      To Whom It May Concern:     Eddie Light was in contact with/seen in my office on 12/07/2023. COVID-19 is present in our communities [facility]. Not all patients are eligible or appropriate to be tested. In this situation, your employee meets the following criteria:     Eddie Light has met the criteria for COVID-19 testing and has a POSITIVE result. He can return to work once they are asymptomatic for 24 hours without the use of fever reducing medications AND at least five days from the start of symptoms (or from the first positive result if they have no symptoms).      If you have any questions or concerns, or if I can be of further assistance, please do not hesitate to contact me.     Sincerely,    ABHINAV Peters

## 2023-12-07 NOTE — PROGRESS NOTES
"Subjective:      Patient ID: Eddie Light is a 28 y.o. male.    Vitals:  height is 5' 7" (1.702 m) and weight is 57.6 kg (127 lb). His oral temperature is 98 °F (36.7 °C). His blood pressure is 117/85 and his pulse is 100. His respiration is 16 and oxygen saturation is 99%.     Chief Complaint: Headache    Headaches, sore throat, congestion, cough x 3 days  Patient has taken OTC medications with mild relief.   Needs COVID testing and would like the COVID medications if positive  Patient has been exposed to COVID at home, home COVID was positive     Other  This is a new problem. The current episode started in the past 7 days. The problem occurs constantly. Associated symptoms include congestion, coughing, headaches and a sore throat. Treatments tried: OTC meds. The treatment provided mild relief.       HENT:  Positive for congestion and sore throat.    Respiratory:  Positive for cough.    Neurological:  Positive for headaches.      Objective:     Physical Exam   Constitutional: He does not appear ill. No distress.   HENT:   Head: Normocephalic and atraumatic.   Ears:   Right Ear: External ear normal.   Left Ear: External ear normal.   Mouth/Throat: Mucous membranes are moist. No oropharyngeal exudate or posterior oropharyngeal erythema. Oropharynx is clear.   Eyes: Conjunctivae are normal. Right eye exhibits no discharge. Left eye exhibits no discharge. Extraocular movement intact   Cardiovascular: Normal rate, regular rhythm and normal heart sounds.   No murmur heard.  Pulmonary/Chest: Effort normal and breath sounds normal. He has no wheezes. He has no rhonchi. He has no rales.   Abdominal: Normal appearance.   Musculoskeletal: Normal range of motion.         General: Normal range of motion.   Neurological: He is alert.   Skin: Skin is warm, dry and not pale. jaundice  Psychiatric: His behavior is normal. Mood, judgment and thought content normal.   Nursing note and vitals reviewed.      Assessment:     1. " COVID-19    2. Sinus congestion        Plan:       COVID-19    Sinus congestion  -     SARS Coronavirus 2 Antigen, POCT Manual Read    Results for orders placed or performed in visit on 12/07/23   SARS Coronavirus 2 Antigen, POCT Manual Read   Result Value Ref Range    SARS Coronavirus 2 Antigen Positive (A) Negative     Acceptable Yes         Other orders  -     nirmatrelvir-ritonavir 300 mg (150 mg x 2)-100 mg copackaged tablets (EUA); Take 3 tablets by mouth 2 (two) times daily for 5 days. Each dose contains 2 nirmatrelvir (pink tablets) and 1 ritonavir (white tablet). Take all 3 tablets together  Dispense: 30 tablet; Refill: 0      Patient Instructions   Your test was POSITIVE for COVID-19 (coronavirus).       Please isolate yourself at home.  You may leave home and/or return to work once the following conditions are met:    If you were not hospitalized and are not moderately to severely immunocompromised:   More than 5 days since symptoms first appeared AND  More than 24 hours fever free without medications AND  Symptoms are improving  Continue to wear a mask around others for 5 additional days.    If you were hospitalized OR are moderately to severely immunocompromised:  More than 20 days since symptoms first appeared  More than 24 hours fever free without medications  Symptoms have improved    If you had no symptoms but tested positive:  More than 5 days since the date of the first positive test (20 days if moderately to severely immunocompromised). If you develop symptoms, then use the guidelines above.  Continue to wear a mask around others for 5 additional days.      Contact Tracing    As one of the next steps, you will receive a call or text from the [place] Department [facility] Health ([facility]) COVID-19 Tracing Team. See the contact information below so you know not to ignore the health departments call. It is important that you contact them back immediately so they can help.      Contact Tracer  Number:  [phone]  Caller ID for most carriers: [place] Dept Health     What is contact tracing?  Contact tracing is a process that helps identify everyone who has been in close contact with an infected person. Contact tracers let those people know they may have been exposed and guide them on next steps. Confidentiality is important for everyone; no one will be told who may have exposed them to the virus.  Your involvement is important. The more we know about where and how this virus is spreading, the better chance we have at stopping it from spreading further.  What does exposure mean?  Exposure means you have been within 6 feet for more than 15 minutes with a person who has or had COVID-19.  What kind of questions do the contact tracers ask?  A contact tracer will confirm your basic contact information including name, address, phone number, and next of kin, as well as asking about any symptoms you may have had. Theyll also ask you how you think you may have gotten sick, such as places where you may have been exposed to the virus, and people you were with. Those names will never be shared with anyone outside of that call, and will only be used to help trace and stop the spread of the virus.   I have privacy concerns. How will the state use my information?  Your privacy about your health is important. All calls are completed using call centers that use the appropriate health privacy protection measures (HIPAA compliance), meaning that your patient information is safe. No one will ever ask you any questions related to immigration status. Your health comes first.   Do I have to participate?  You do not have to participate, but we strongly encourage you to. Contact tracing can help us catch and control new outbreaks as theyre developing to keep your friends and family safe.   What if I dont hear from anyone?  If you dont receive a call within 24 hours, you can call the number above right away to inquire about your  status. That line is open from 8:00 am - 8:00 p.m., 7 days a week.  Contact tracing saves lives! Together, we have the power to beat this virus and keep our loved ones and neighbors safe.    For more information see CDC link below.      https://www.cdc.gov/coronavirus/2019-ncov/hcp/guidance-prevent-spread.html#precautions        Sources:  [facility], [place] [facility] and [facility]           Sincerely,     ABHINAV Peters

## 2024-02-13 ENCOUNTER — PATIENT MESSAGE (OUTPATIENT)
Dept: NEUROLOGY | Facility: CLINIC | Age: 29
End: 2024-02-13
Payer: COMMERCIAL

## 2024-02-15 ENCOUNTER — PATIENT MESSAGE (OUTPATIENT)
Dept: NEUROLOGY | Facility: CLINIC | Age: 29
End: 2024-02-15
Payer: COMMERCIAL

## 2024-02-15 ENCOUNTER — TELEPHONE (OUTPATIENT)
Dept: NEUROLOGY | Facility: CLINIC | Age: 29
End: 2024-02-15

## 2024-02-15 NOTE — TELEPHONE ENCOUNTER
----- Message from Patel Mast PA-C sent at 2/15/2024  7:36 AM CST -----  Regarding: Epilepsy  Good morning,      This is a patient I have shared with Dr. Rowell. Can we please get him established with a new epileptologist?  Many thanks B!    Michael

## 2024-02-20 ENCOUNTER — TELEPHONE (OUTPATIENT)
Dept: NEUROLOGY | Facility: CLINIC | Age: 29
End: 2024-02-20

## 2024-02-25 DIAGNOSIS — G40.909 SEIZURE DISORDER: ICD-10-CM

## 2024-02-26 ENCOUNTER — TELEPHONE (OUTPATIENT)
Dept: NEUROLOGY | Facility: CLINIC | Age: 29
End: 2024-02-26
Payer: COMMERCIAL

## 2024-02-26 DIAGNOSIS — G40.909 SEIZURE DISORDER: ICD-10-CM

## 2024-02-26 RX ORDER — LEVETIRACETAM 500 MG/1
1500 TABLET, EXTENDED RELEASE ORAL DAILY
Qty: 90 TABLET | Refills: 1 | Status: CANCELLED | OUTPATIENT
Start: 2024-02-26 | End: 2025-02-25

## 2024-02-26 RX ORDER — LEVETIRACETAM 500 MG/1
1500 TABLET, EXTENDED RELEASE ORAL DAILY
Qty: 90 TABLET | Refills: 1 | Status: SHIPPED | OUTPATIENT
Start: 2024-02-26 | End: 2024-02-27 | Stop reason: SDUPTHER

## 2024-02-27 RX ORDER — LEVETIRACETAM 500 MG/1
1500 TABLET, EXTENDED RELEASE ORAL DAILY
Qty: 90 TABLET | Refills: 3 | Status: SHIPPED | OUTPATIENT
Start: 2024-02-27 | End: 2024-07-01

## 2024-04-03 ENCOUNTER — OFFICE VISIT (OUTPATIENT)
Dept: NEUROLOGY | Facility: CLINIC | Age: 29
End: 2024-04-03
Payer: COMMERCIAL

## 2024-04-03 VITALS
BODY MASS INDEX: 20.22 KG/M2 | SYSTOLIC BLOOD PRESSURE: 102 MMHG | HEART RATE: 68 BPM | WEIGHT: 129.06 LBS | DIASTOLIC BLOOD PRESSURE: 62 MMHG

## 2024-04-03 DIAGNOSIS — G40.209 LOCALIZATION-RELATED (FOCAL) (PARTIAL) SYMPTOMATIC EPILEPSY AND EPILEPTIC SYNDROMES WITH COMPLEX PARTIAL SEIZURES, NOT INTRACTABLE, WITHOUT STATUS EPILEPTICUS: Primary | Chronic | ICD-10-CM

## 2024-04-03 PROCEDURE — 1159F MED LIST DOCD IN RCRD: CPT | Mod: CPTII,S$GLB,, | Performed by: STUDENT IN AN ORGANIZED HEALTH CARE EDUCATION/TRAINING PROGRAM

## 2024-04-03 PROCEDURE — 1160F RVW MEDS BY RX/DR IN RCRD: CPT | Mod: CPTII,S$GLB,, | Performed by: STUDENT IN AN ORGANIZED HEALTH CARE EDUCATION/TRAINING PROGRAM

## 2024-04-03 PROCEDURE — 3074F SYST BP LT 130 MM HG: CPT | Mod: CPTII,S$GLB,, | Performed by: STUDENT IN AN ORGANIZED HEALTH CARE EDUCATION/TRAINING PROGRAM

## 2024-04-03 PROCEDURE — 3008F BODY MASS INDEX DOCD: CPT | Mod: CPTII,S$GLB,, | Performed by: STUDENT IN AN ORGANIZED HEALTH CARE EDUCATION/TRAINING PROGRAM

## 2024-04-03 PROCEDURE — 3078F DIAST BP <80 MM HG: CPT | Mod: CPTII,S$GLB,, | Performed by: STUDENT IN AN ORGANIZED HEALTH CARE EDUCATION/TRAINING PROGRAM

## 2024-04-03 PROCEDURE — 99999 PR PBB SHADOW E&M-EST. PATIENT-LVL III: CPT | Mod: PBBFAC,,, | Performed by: STUDENT IN AN ORGANIZED HEALTH CARE EDUCATION/TRAINING PROGRAM

## 2024-04-03 PROCEDURE — 99213 OFFICE O/P EST LOW 20 MIN: CPT | Mod: S$GLB,,, | Performed by: STUDENT IN AN ORGANIZED HEALTH CARE EDUCATION/TRAINING PROGRAM

## 2024-04-03 NOTE — ASSESSMENT & PLAN NOTE
Stable, continue Keppra XR 1500 mg/day.  Has associated fatigue.  We did discuss trial of lowering dose vs switching to alternative agent however patient is hesitant about breakthrough seizures (which would be my concern as well).    RTC 1 year

## 2024-04-03 NOTE — PROGRESS NOTES
[facility]  Epilepsy Clinic Progress Note      [facility] - NEUROLOGY  [facility], [place]    Date: 4/3/24  Patient Name: Eddie Light   MRN: [id number]   PCP: Calixto Reyes  Referring Provider: No ref. provider found    Assessment:     4 Dimensional Epilepsy Classification  Epileptic paroxysmal events  Semiology: Generalized tonic clonic seizures  Epileptogenic zone: unknown  Etiology: structural (subependymal gray matter heterotopia)  Co-morbidities: none    Plan:     Problem List Items Addressed This Visit          Neuro    Localization-related (focal) (partial) symptomatic epilepsy and epileptic syndromes with complex partial seizures, not intractable, without status epilepticus - Primary (Chronic)    Current Assessment & Plan     Stable, continue Keppra XR 1500 mg/day.  Has associated fatigue.  We did discuss trial of lowering dose vs switching to alternative agent however patient is hesitant about breakthrough seizures (which would be my concern as well).    RTC 1 year            I completed education on seizure first aid and safety. I recommended seizure precautions with regards to avoiding unsupervised water recreational activity or bathing in tubs, climbing or working at heights, operation of heavy or dangerous machinery, caution around fire and sources of high heat, as well as any other activity which could put a patient at danger in case of a seizure.  I also reviewed the [place] DMV law and recommended that the patient not drive for 6 months in the event of breakthrough seizures.    Luzmaria Thrasher MD  [facility]   Department of Neurology    Patient note was created using MModal Dictation.  Any errors in syntax or even information may not have been identified and edited on initial review prior to signing this note.  Subjective:          Mr. Eddie Light is a 28 y.o. male returns to clinic for continued management of epilepsy, likely focal onset.  " He is new to me - previously saw Dr Rowell.     Interval history:  Plan at time of last clinic visit was continue Keppra.      Since the last visit, patient reports no seizures.  He is doing well other than associated fatigue from Keppra.      Initial H&P (Dr Rowell):    "The patient is a 27 y.o. male referred for evaluation of episodes suspicious for seizures. These began at about age 10-12.  The patient has no consistent warning, though he says that he has seen a red light before one event.  His seizure is characterized by generalized stiffening and shaking.  There is a history of tongue biting.  This component of this spell lasts for approximately 2 minutes.  Afterwards, he reports confusion/fatigue.  The patient's frequency of events is roughly once a year.     The patient has no family history of seizures.  He reports a history of prenatal/ complications with the patient having been born prematurely. There is no history of febrile seizures.  He notes no history of CNS infections. He claims no history of significant head trauma. There is a history of developmental abnormalities on his MRI of the brain as described below."     Current AEDs:  Keppra XR 1000 mg QHS     Prior AEDs:  Phenobarbital (lethargy)  Zonegran (drowsiness)  Lamictal (drowsiness)      MRI brain ():  "1. 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  2. 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 This is not optimally evaluated secondary to motion artifact and metal artifact within the mouth.  3. Dysgenesis of the corpus callosum.  4. Fluid within right mastoid air cells. Please correlate clinically for symptoms of mastoiditis."     EEG (11/15):  "Normal EEG"     EEG ():  Normal    PAST MEDICAL HISTORY:  Past Medical History:   Diagnosis Date    ADHD (attention deficit hyperactivity disorder)     Localization-related (focal) (partial) symptomatic epilepsy and epileptic " syndromes with complex partial seizures, not intractable, without status epilepticus 10/09/2019    Migraine headache 10/24/2022    Short stature for age     Dr. Elena Garcia    Wears glasses     Dr. Lara       PAST SURGICAL HISTORY:  Past Surgical History:   Procedure Laterality Date    HERNIA REPAIR      TYMPANOSTOMY TUBE PLACEMENT         CURRENT MEDS:  Current Outpatient Medications   Medication Sig Dispense Refill    cholecalciferol, vitamin D3, 10 mcg (400 unit) Chew Take 1 capsule by mouth once daily.      levetiracetam XR (KEPPRA XR) 500 mg Tb24 24 hr tablet Take 3 tablets (1,500 mg total) by mouth once daily. 90 tablet 3    multivitamin (THERAGRAN) per tablet Take 1 tablet by mouth once daily.      nortriptyline (PAMELOR) 10 MG capsule Take 2 capsules (20 mg total) by mouth nightly. 60 capsule 1    rizatriptan (MAXALT-MLT) 10 MG disintegrating tablet Dissolve 1 melt by mouth at onset at headache, second melt 2 hours later if needed, no more than 2 melts day/ 3days use in week 12 tablet 5     No current facility-administered medications for this visit.       ALLERGIES:  Review of patient's allergies indicates:  No Known Allergies    FAMILY HISTORY:  Family History   Problem Relation Age of Onset    Hypertension Maternal Grandmother     Cancer Maternal Grandmother     Heart disease Maternal Grandfather     COPD Paternal Grandmother     Hypertension Paternal Grandmother     COPD Paternal Grandfather     Hypertension Paternal Grandfather        SOCIAL HISTORY:  Social History     Tobacco Use    Smoking status: Never    Smokeless tobacco: Never   Substance Use Topics    Alcohol use: Not Currently     Alcohol/week: 1.0 standard drink of alcohol     Types: 1 Standard drinks or equivalent per week     Comment: very rare    Drug use: No       Review of Systems:  12 system review of systems is negative except for the symptoms mentioned in HPI.      Objective:   There were no vitals filed for this  visit.  General: NAD, well nourished   Eyes: no tearing, discharge, no erythema   ENT: moist mucous membranes of the oral cavity, nares patent    Neck: Supple, full range of motion  Cardiovascular: Warm and well perfused, pulses equal and symmetrical  Lungs: Normal work of breathing, normal chest wall excursions  Skin: No rash, lesions, or breakdown on exposed skin  Psychiatry: Mood and affect are appropriate   Abdomen: soft, non tender, non distended  Extremeties: No cyanosis, clubbing or edema.    Neurological   MENTAL STATUS: Alert and oriented to person, place, and time. Attention and concentration within normal limits. Speech without dysarthria, able to name and repeat without difficulty. Recent and remote memory within normal limits   CRANIAL NERVES: Visual fields intact. PERRL. EOMI. Facial sensation intact. Face symmetrical. Hearing grossly intact. Full shoulder shrug bilaterally. Tongue protrudes midline   SENSORY: Sensation is intact to light touch throughout.  Joint position perception intact. Negative Romberg.   MOTOR: Normal bulk and tone. No pronator drift.  5/5 deltoid, biceps, triceps, interosseous, hand  bilaterally. 5/5 iliopsoas, knee extension/flexion, foot dorsi/plantarflexion bilaterally.    REFLEXES: Symmetric and 2+ throughout. Toes down going bilaterally.   CEREBELLAR/COORDINATION/GAIT: Gait steady with normal arm swing and stride length.  Heel to shin intact. Finger to nose intact. Normal rapid alternating movements.

## 2024-06-27 DIAGNOSIS — G40.909 SEIZURE DISORDER: ICD-10-CM

## 2024-06-27 RX ORDER — LEVETIRACETAM 500 MG/1
1500 TABLET, EXTENDED RELEASE ORAL DAILY
Qty: 270 TABLET | Refills: 3 | Status: SHIPPED | OUTPATIENT
Start: 2024-06-27

## 2024-09-30 DIAGNOSIS — G43.709 CHRONIC MIGRAINE WITHOUT AURA WITHOUT STATUS MIGRAINOSUS, NOT INTRACTABLE: ICD-10-CM

## 2024-09-30 RX ORDER — RIZATRIPTAN BENZOATE 10 MG/1
TABLET, ORALLY DISINTEGRATING ORAL
Qty: 12 TABLET | Refills: 5 | Status: CANCELLED | OUTPATIENT
Start: 2024-09-30

## 2024-10-01 DIAGNOSIS — G43.709 CHRONIC MIGRAINE WITHOUT AURA WITHOUT STATUS MIGRAINOSUS, NOT INTRACTABLE: ICD-10-CM

## 2024-10-01 RX ORDER — RIZATRIPTAN BENZOATE 10 MG/1
TABLET, ORALLY DISINTEGRATING ORAL
Qty: 12 TABLET | Refills: 5 | OUTPATIENT
Start: 2024-10-01

## 2025-03-31 ENCOUNTER — PATIENT MESSAGE (OUTPATIENT)
Dept: FAMILY MEDICINE | Facility: CLINIC | Age: 30
End: 2025-03-31
Payer: COMMERCIAL

## 2025-04-02 ENCOUNTER — OFFICE VISIT (OUTPATIENT)
Dept: FAMILY MEDICINE | Facility: CLINIC | Age: 30
End: 2025-04-02
Payer: COMMERCIAL

## 2025-04-02 ENCOUNTER — APPOINTMENT (OUTPATIENT)
Dept: LAB | Facility: HOSPITAL | Age: 30
End: 2025-04-02
Payer: COMMERCIAL

## 2025-04-02 VITALS
WEIGHT: 129.75 LBS | HEIGHT: 67 IN | HEART RATE: 72 BPM | BODY MASS INDEX: 20.36 KG/M2 | OXYGEN SATURATION: 98 % | DIASTOLIC BLOOD PRESSURE: 74 MMHG | SYSTOLIC BLOOD PRESSURE: 112 MMHG

## 2025-04-02 DIAGNOSIS — Z00.00 PREVENTATIVE HEALTH CARE: Primary | ICD-10-CM

## 2025-04-02 DIAGNOSIS — Z13.220 ENCOUNTER FOR SCREENING FOR LIPID DISORDER: ICD-10-CM

## 2025-04-02 DIAGNOSIS — R35.0 URINARY FREQUENCY: ICD-10-CM

## 2025-04-02 DIAGNOSIS — Z13.1 ENCOUNTER FOR SCREENING FOR DIABETES MELLITUS: ICD-10-CM

## 2025-04-02 DIAGNOSIS — B35.1 ONYCHOMYCOSIS: ICD-10-CM

## 2025-04-02 DIAGNOSIS — M26.629 TMJ SYNDROME: ICD-10-CM

## 2025-04-02 DIAGNOSIS — R62.51 NOT GAINING WEIGHT: ICD-10-CM

## 2025-04-02 PROBLEM — G43.909 MIGRAINE HEADACHE: Chronic | Status: ACTIVE | Noted: 2022-10-24

## 2025-04-02 LAB
BILIRUB UR QL STRIP.AUTO: NEGATIVE
CLARITY UR: CLEAR
COLOR UR AUTO: COLORLESS
GLUCOSE SERPL-MCNC: 136 MG/DL (ref 70–110)
GLUCOSE UR QL STRIP: NEGATIVE
HGB UR QL STRIP: NEGATIVE
KETONES UR QL STRIP: NEGATIVE
LEUKOCYTE ESTERASE UR QL STRIP: NEGATIVE
NITRITE UR QL STRIP: NEGATIVE
PH UR STRIP: 7 [PH]
PROT UR QL STRIP: NEGATIVE
SP GR UR STRIP: 1
UROBILINOGEN UR STRIP-ACNC: NEGATIVE EU/DL

## 2025-04-02 PROCEDURE — 3078F DIAST BP <80 MM HG: CPT | Mod: CPTII,S$GLB,, | Performed by: STUDENT IN AN ORGANIZED HEALTH CARE EDUCATION/TRAINING PROGRAM

## 2025-04-02 PROCEDURE — 1159F MED LIST DOCD IN RCRD: CPT | Mod: CPTII,S$GLB,, | Performed by: STUDENT IN AN ORGANIZED HEALTH CARE EDUCATION/TRAINING PROGRAM

## 2025-04-02 PROCEDURE — 81003 URINALYSIS AUTO W/O SCOPE: CPT | Performed by: STUDENT IN AN ORGANIZED HEALTH CARE EDUCATION/TRAINING PROGRAM

## 2025-04-02 PROCEDURE — 99999 PR PBB SHADOW E&M-EST. PATIENT-LVL IV: CPT | Mod: PBBFAC,,, | Performed by: STUDENT IN AN ORGANIZED HEALTH CARE EDUCATION/TRAINING PROGRAM

## 2025-04-02 PROCEDURE — 3074F SYST BP LT 130 MM HG: CPT | Mod: CPTII,S$GLB,, | Performed by: STUDENT IN AN ORGANIZED HEALTH CARE EDUCATION/TRAINING PROGRAM

## 2025-04-02 PROCEDURE — 82962 GLUCOSE BLOOD TEST: CPT | Mod: S$GLB,,, | Performed by: STUDENT IN AN ORGANIZED HEALTH CARE EDUCATION/TRAINING PROGRAM

## 2025-04-02 PROCEDURE — 99395 PREV VISIT EST AGE 18-39: CPT | Mod: S$GLB,,, | Performed by: STUDENT IN AN ORGANIZED HEALTH CARE EDUCATION/TRAINING PROGRAM

## 2025-04-02 PROCEDURE — 99214 OFFICE O/P EST MOD 30 MIN: CPT | Mod: 25,S$GLB,, | Performed by: STUDENT IN AN ORGANIZED HEALTH CARE EDUCATION/TRAINING PROGRAM

## 2025-04-02 PROCEDURE — 3008F BODY MASS INDEX DOCD: CPT | Mod: CPTII,S$GLB,, | Performed by: STUDENT IN AN ORGANIZED HEALTH CARE EDUCATION/TRAINING PROGRAM

## 2025-04-02 PROCEDURE — 1160F RVW MEDS BY RX/DR IN RCRD: CPT | Mod: CPTII,S$GLB,, | Performed by: STUDENT IN AN ORGANIZED HEALTH CARE EDUCATION/TRAINING PROGRAM

## 2025-04-02 RX ORDER — MIRTAZAPINE 7.5 MG/1
7.5 TABLET, FILM COATED ORAL NIGHTLY
Qty: 30 TABLET | Refills: 11 | Status: SHIPPED | OUTPATIENT
Start: 2025-04-02 | End: 2026-04-02

## 2025-04-02 RX ORDER — TERBINAFINE HYDROCHLORIDE 250 MG/1
250 TABLET ORAL DAILY
Qty: 45 TABLET | Refills: 1 | Status: SHIPPED | OUTPATIENT
Start: 2025-04-02 | End: 2025-07-01

## 2025-04-02 NOTE — PROGRESS NOTES
Plan:     Eddie was seen today for follow-up.    Diagnoses and all orders for this visit:    Preventative health care  -     Hemoglobin A1C; Future  -     Lipid Panel; Future  -     Comprehensive Metabolic Panel; Future  -     CBC Auto Differential; Future    Encounter for screening for diabetes mellitus  -     Hemoglobin A1C; Future  -     Comprehensive Metabolic Panel; Future    Encounter for screening for lipid disorder  -     Lipid Panel; Future    Not gaining weight  -     Hemoglobin A1C; Future  -     TSH; Future  -     Testosterone; Future  -     mirtazapine (REMERON) 7.5 MG Tab; Take 1 tablet (7.5 mg total) by mouth every evening.    Urinary frequency  -     Hemoglobin A1C; Future  -     Comprehensive Metabolic Panel; Future  -     CBC Auto Differential; Future  -     Urinalysis, Reflex to Urine Culture; Future  -     Ambulatory referral/consult to Urology; Future  -     Urinalysis, Reflex to Urine Culture  -     POCT Glucose, Hand-Held Device    TMJ syndrome  -     Ambulatory referral/consult to ENT; Future    Onychomycosis  -     terbinafine HCL (LAMISIL) 250 mg tablet; Take 1 tablet (250 mg total) by mouth once daily.  -     Comprehensive Metabolic Panel; Future       Follow up in about 4 weeks (around 4/30/2025), or if symptoms worsen or fail to improve.    Radha Diaz MD  04/02/2025    Subjective:      Patient ID: Eddie Light is a 29 y.o. male    Chief Complaint   Patient presents with    Follow-up     Wants some blood work can't gain weight      HPI  29 y.o. male with a PMHx as documented below presents to clinic today for the following:    CHIEF COMPLAINT:  Patient presents today for headaches and general checkup.    NEUROLOGICAL HISTORY:  His first major seizure occurred at age 12, with subsequent major seizures approximately every two years for a total of six episodes. He is currently on three seizure medications and reports no seizures in the past four years.    URINARY SYMPTOMS:  He  reports frequent urination occurring daily, with episodes as frequent as every 10-20 minutes and nocturia 4-5 times per night. He reports normal urine output volume with each void. These symptoms have been present for months to years without improvement. He denies urgency or small volume voids.    GROWTH AND DEVELOPMENT:  He has a history of growth issues since childhood, requiring hormone treatment to induce growth. He was in the 1st percentile for growth as a child. His current weight is 127 lbs.    GASTROINTESTINAL AND ORAL ISSUES:  He reports poor appetite and a history of severe chronic constipation. He experiences severe TMJ with constant jaw popping during eating.    DERMATOLOGIC:  He has severe toe fungus with history of nail detachment and regrowth. Previous treatments included topical medications and soaking remedies.    Review of Systems   HENT:  Negative for hearing loss.    Eyes:  Negative for discharge.   Respiratory:  Negative for wheezing.    Cardiovascular:  Negative for chest pain and palpitations.   Gastrointestinal:  Positive for constipation. Negative for blood in stool, diarrhea and vomiting.   Genitourinary:  Positive for urgency. Negative for hematuria.   Musculoskeletal:  Negative for neck pain.   Neurological:  Positive for headaches. Negative for weakness.   Endo/Heme/Allergies:  Negative for polydipsia.     Answers submitted by the patient for this visit:  Review of Systems Questionnaire (Submitted on 4/2/2025)  activity change: No  unexpected weight change: Yes  rhinorrhea: No  trouble swallowing: No  chest tightness: No  polyuria: Yes  difficulty urinating: No  joint swelling: No  arthralgias: No  confusion: No  dysphoric mood: No    Current Outpatient Medications   Medication Instructions    levetiracetam XR (KEPPRA XR) 1,500 mg, Oral, Daily    multivitamin (THERAGRAN) per tablet 1 tablet, Daily    rizatriptan (MAXALT-MLT) 10 MG disintegrating tablet Dissolve 1 melt by mouth at onset  at headache, second melt 2 hours later if needed, no more than 2 melts day/ 3days use in week      Past Medical History:   Diagnosis Date    ADHD (attention deficit hyperactivity disorder)     Growth hormone deficiency 10/18/2013    Localization-related (focal) (partial) symptomatic epilepsy and epileptic syndromes with complex partial seizures, not intractable, without status epilepticus 10/09/2019    Migraine headache 10/24/2022    Short stature for age     Dr. Elena Garcia    Wears glasses     Dr. Lara     Past Surgical History:   Procedure Laterality Date    HERNIA REPAIR      TYMPANOSTOMY TUBE PLACEMENT       Review of patient's allergies indicates:  No Known Allergies    Family History   Problem Relation Name Age of Onset    Hypertension Maternal Grandmother Merlin Strickland     Cancer Maternal Grandmother Merlin Strickland     Heart disease Maternal Grandfather      COPD Paternal Grandmother Georgie Alvarezesser     Hypertension Paternal Grandmother Georgie Alvarezesser     COPD Paternal Grandfather Murtaza Hookerngesswhit     Hypertension Paternal Grandfather Murtaza Hookercresenciokamraner      Social History[1]    Currently on File with [facility]:   Most Recent Immunizations   Administered Date(s) Administered    COVID-19, MRNA, LN-S, PF (Pfizer) ([place] [facility]) 03/23/2022    COVID-19, vector-nr, rS-Ad26, PF ([facility]) 03/31/2021    DTaP 10/02/2003    HIB 10/09/1998    HPV 9-Valent 10/23/2020    Hepatitis B, Pediatric/Adolescent 03/23/2009    IPV 10/02/2003    Influenza 10/03/2019    Influenza (Flumist) - Quadrivalent - Intranasal *Preferred* (2-49 years old) 12/17/2015    Influenza - Intranasal 10/18/2013    Influenza - Intranasal - Trivalent 10/23/2012    Influenza - Quadrivalent - PF *Preferred* (6 months and older) 10/24/2022    Influenza - Trivalent (PED) 11/25/2006    MMR 10/02/2003    Meningococcal Conjugate (MCV4P) 03/17/2009    Tdap 10/23/2020    Varicella 03/17/2009     Objective:      Vitals:    04/02/25 1251   BP:  "112/74   Pulse: 72   SpO2: 98%   Weight: 58.8 kg (129 lb 11.9 oz)   Height: 5' 7" (1.702 m)     Body mass index is 20.32 kg/m².    Physical Exam   Constitutional:       General: No acute distress.  HENT:      Head: Normocephalic and atraumatic.   Pulmonary:      Effort: Pulmonary effort is normal. No respiratory distress.   Neurological:      General: No focal deficit present.      Mental Status: Alert and oriented to person, place, and time. Mental status is at baseline.    Assessment:       1. Preventative health care    2. Encounter for screening for diabetes mellitus    3. Encounter for screening for lipid disorder    4. Not gaining weight    5. Urinary frequency    6. TMJ syndrome    7. Onychomycosis        Radha Diaz MD  [facility]  Office: [phone]   Fax: [phone]  04/02/2025      Disclaimer: This note was partly generated using dictation software which may occasionally result in transcription errors.    Total time spent on this encounter includes face to face time and non-face to face time preparing to see the patient (eg, review of tests), obtaining and/or reviewing separately obtained history, documenting clinical information in the electronic or other health record, independently interpreting results, and communicating results to the patient/family/caregiver, or care coordinator.      Visit today included increased complexity associated with the care of the episodic problem (see above) addressed and managing the longitudinal care of the patient due to the serious and/or complex managed problem(s) (see above).          [1]   Social History  Tobacco Use    Smoking status: Never    Smokeless tobacco: Never   Substance Use Topics    Alcohol use: Not Currently     Alcohol/week: 1.0 standard drink of alcohol     Types: 1 Unspecified drink type per week     Comment: very rare    Drug use: No     "

## 2025-04-03 ENCOUNTER — LAB VISIT (OUTPATIENT)
Dept: LAB | Facility: HOSPITAL | Age: 30
End: 2025-04-03
Attending: STUDENT IN AN ORGANIZED HEALTH CARE EDUCATION/TRAINING PROGRAM
Payer: COMMERCIAL

## 2025-04-03 DIAGNOSIS — R62.51 NOT GAINING WEIGHT: ICD-10-CM

## 2025-04-03 DIAGNOSIS — Z13.1 ENCOUNTER FOR SCREENING FOR DIABETES MELLITUS: ICD-10-CM

## 2025-04-03 DIAGNOSIS — Z00.00 PREVENTATIVE HEALTH CARE: ICD-10-CM

## 2025-04-03 DIAGNOSIS — R35.0 URINARY FREQUENCY: ICD-10-CM

## 2025-04-03 DIAGNOSIS — Z13.220 ENCOUNTER FOR SCREENING FOR LIPID DISORDER: ICD-10-CM

## 2025-04-03 LAB
ABSOLUTE EOSINOPHIL (OHS): 0.12 K/UL
ABSOLUTE MONOCYTE (OHS): 0.4 K/UL (ref 0.3–1)
ABSOLUTE NEUTROPHIL COUNT (OHS): 1.61 K/UL (ref 1.8–7.7)
ALBUMIN SERPL BCP-MCNC: 4.2 G/DL (ref 3.5–5.2)
ALP SERPL-CCNC: 66 UNIT/L (ref 40–150)
ALT SERPL W/O P-5'-P-CCNC: 12 UNIT/L (ref 10–44)
ANION GAP (OHS): 9 MMOL/L (ref 8–16)
AST SERPL-CCNC: 20 UNIT/L (ref 11–45)
BASOPHILS # BLD AUTO: 0.03 K/UL
BASOPHILS NFR BLD AUTO: 0.7 %
BILIRUB SERPL-MCNC: 0.7 MG/DL (ref 0.1–1)
BUN SERPL-MCNC: 12 MG/DL (ref 6–20)
CALCIUM SERPL-MCNC: 9 MG/DL (ref 8.7–10.5)
CHLORIDE SERPL-SCNC: 106 MMOL/L (ref 95–110)
CHOLEST SERPL-MCNC: 149 MG/DL (ref 120–199)
CHOLEST/HDLC SERPL: 3 {RATIO} (ref 2–5)
CO2 SERPL-SCNC: 24 MMOL/L (ref 23–29)
CREAT SERPL-MCNC: 0.9 MG/DL (ref 0.5–1.4)
EAG (OHS): 105 MG/DL (ref 68–131)
ERYTHROCYTE [DISTWIDTH] IN BLOOD BY AUTOMATED COUNT: 12.5 % (ref 11.5–14.5)
GFR SERPLBLD CREATININE-BSD FMLA CKD-EPI: >60 ML/MIN/1.73/M2
GLUCOSE SERPL-MCNC: 76 MG/DL (ref 70–110)
HBA1C MFR BLD: 5.3 % (ref 4–5.6)
HCT VFR BLD AUTO: 45 % (ref 40–54)
HDLC SERPL-MCNC: 50 MG/DL (ref 40–75)
HDLC SERPL: 33.6 % (ref 20–50)
HGB BLD-MCNC: 14.4 GM/DL (ref 14–18)
IMM GRANULOCYTES # BLD AUTO: 0.01 K/UL (ref 0–0.04)
IMM GRANULOCYTES NFR BLD AUTO: 0.2 % (ref 0–0.5)
LDLC SERPL CALC-MCNC: 91.4 MG/DL (ref 63–159)
LYMPHOCYTES # BLD AUTO: 1.84 K/UL (ref 1–4.8)
MCH RBC QN AUTO: 28.8 PG (ref 27–31)
MCHC RBC AUTO-ENTMCNC: 32 G/DL (ref 32–36)
MCV RBC AUTO: 90 FL (ref 82–98)
NONHDLC SERPL-MCNC: 99 MG/DL
NUCLEATED RBC (/100WBC) (OHS): 0 /100 WBC
PLATELET # BLD AUTO: 255 K/UL (ref 150–450)
PMV BLD AUTO: 10.7 FL (ref 9.2–12.9)
POTASSIUM SERPL-SCNC: 4.4 MMOL/L (ref 3.5–5.1)
PROT SERPL-MCNC: 7.1 GM/DL (ref 6–8.4)
RBC # BLD AUTO: 5 M/UL (ref 4.6–6.2)
RELATIVE EOSINOPHIL (OHS): 3 %
RELATIVE LYMPHOCYTE (OHS): 45.9 % (ref 18–48)
RELATIVE MONOCYTE (OHS): 10 % (ref 4–15)
RELATIVE NEUTROPHIL (OHS): 40.2 % (ref 38–73)
SODIUM SERPL-SCNC: 139 MMOL/L (ref 136–145)
TESTOST SERPL-MCNC: 331 NG/DL (ref 304–1227)
TRIGL SERPL-MCNC: 38 MG/DL (ref 30–150)
TSH SERPL-ACNC: 0.82 UIU/ML (ref 0.4–4)
WBC # BLD AUTO: 4.01 K/UL (ref 3.9–12.7)

## 2025-04-03 PROCEDURE — 80061 LIPID PANEL: CPT

## 2025-04-03 PROCEDURE — 36415 COLL VENOUS BLD VENIPUNCTURE: CPT | Mod: PN

## 2025-04-03 PROCEDURE — 80053 COMPREHEN METABOLIC PANEL: CPT

## 2025-04-03 PROCEDURE — 83036 HEMOGLOBIN GLYCOSYLATED A1C: CPT

## 2025-04-03 PROCEDURE — 85025 COMPLETE CBC W/AUTO DIFF WBC: CPT

## 2025-04-03 PROCEDURE — 84443 ASSAY THYROID STIM HORMONE: CPT

## 2025-04-03 PROCEDURE — 84403 ASSAY OF TOTAL TESTOSTERONE: CPT

## 2025-04-04 ENCOUNTER — RESULTS FOLLOW-UP (OUTPATIENT)
Dept: FAMILY MEDICINE | Facility: CLINIC | Age: 30
End: 2025-04-04

## 2025-04-10 ENCOUNTER — PATIENT MESSAGE (OUTPATIENT)
Dept: UROLOGY | Facility: CLINIC | Age: 30
End: 2025-04-10

## 2025-04-10 ENCOUNTER — OFFICE VISIT (OUTPATIENT)
Dept: OTOLARYNGOLOGY | Facility: CLINIC | Age: 30
End: 2025-04-10
Payer: COMMERCIAL

## 2025-04-10 ENCOUNTER — OFFICE VISIT (OUTPATIENT)
Dept: UROLOGY | Facility: CLINIC | Age: 30
End: 2025-04-10
Payer: COMMERCIAL

## 2025-04-10 VITALS — HEIGHT: 67 IN | WEIGHT: 134.94 LBS | BODY MASS INDEX: 21.18 KG/M2

## 2025-04-10 VITALS — WEIGHT: 136.44 LBS | BODY MASS INDEX: 21.42 KG/M2 | HEIGHT: 67 IN

## 2025-04-10 DIAGNOSIS — G43.909 MIGRAINE SYNDROME: ICD-10-CM

## 2025-04-10 DIAGNOSIS — R35.0 URINARY FREQUENCY: ICD-10-CM

## 2025-04-10 DIAGNOSIS — M26.629 TMJ SYNDROME: Primary | ICD-10-CM

## 2025-04-10 LAB
BILIRUBIN, UA POC OHS: NEGATIVE
BLOOD, UA POC OHS: NEGATIVE
CLARITY, UA POC OHS: CLEAR
COLOR, UA POC OHS: YELLOW
GLUCOSE, UA POC OHS: NEGATIVE
KETONES, UA POC OHS: NEGATIVE
LEUKOCYTES, UA POC OHS: NEGATIVE
NITRITE, UA POC OHS: NEGATIVE
PH, UA POC OHS: 6
PROTEIN, UA POC OHS: NEGATIVE
SPECIFIC GRAVITY, UA POC OHS: 1.02
UROBILINOGEN, UA POC OHS: 0.2

## 2025-04-10 PROCEDURE — 3044F HG A1C LEVEL LT 7.0%: CPT | Mod: CPTII,S$GLB,,

## 2025-04-10 PROCEDURE — 99999 PR PBB SHADOW E&M-EST. PATIENT-LVL III: CPT | Mod: PBBFAC,,,

## 2025-04-10 PROCEDURE — 3008F BODY MASS INDEX DOCD: CPT | Mod: CPTII,S$GLB,,

## 2025-04-10 PROCEDURE — 99203 OFFICE O/P NEW LOW 30 MIN: CPT | Mod: S$GLB,,,

## 2025-04-10 PROCEDURE — 1159F MED LIST DOCD IN RCRD: CPT | Mod: CPTII,S$GLB,, | Performed by: STUDENT IN AN ORGANIZED HEALTH CARE EDUCATION/TRAINING PROGRAM

## 2025-04-10 PROCEDURE — 99999 PR PBB SHADOW E&M-EST. PATIENT-LVL IV: CPT | Mod: PBBFAC,,, | Performed by: STUDENT IN AN ORGANIZED HEALTH CARE EDUCATION/TRAINING PROGRAM

## 2025-04-10 PROCEDURE — 3044F HG A1C LEVEL LT 7.0%: CPT | Mod: CPTII,S$GLB,, | Performed by: STUDENT IN AN ORGANIZED HEALTH CARE EDUCATION/TRAINING PROGRAM

## 2025-04-10 PROCEDURE — 1159F MED LIST DOCD IN RCRD: CPT | Mod: CPTII,S$GLB,,

## 2025-04-10 PROCEDURE — 3008F BODY MASS INDEX DOCD: CPT | Mod: CPTII,S$GLB,, | Performed by: STUDENT IN AN ORGANIZED HEALTH CARE EDUCATION/TRAINING PROGRAM

## 2025-04-10 PROCEDURE — 81003 URINALYSIS AUTO W/O SCOPE: CPT | Mod: QW,S$GLB,,

## 2025-04-10 PROCEDURE — 1160F RVW MEDS BY RX/DR IN RCRD: CPT | Mod: CPTII,S$GLB,,

## 2025-04-10 PROCEDURE — 51798 US URINE CAPACITY MEASURE: CPT | Mod: S$GLB,,,

## 2025-04-10 PROCEDURE — 99203 OFFICE O/P NEW LOW 30 MIN: CPT | Mod: S$GLB,,, | Performed by: STUDENT IN AN ORGANIZED HEALTH CARE EDUCATION/TRAINING PROGRAM

## 2025-04-10 RX ORDER — OXYBUTYNIN CHLORIDE 5 MG/1
5 TABLET, EXTENDED RELEASE ORAL DAILY
Qty: 30 TABLET | Refills: 11 | Status: SHIPPED | OUTPATIENT
Start: 2025-04-10 | End: 2026-04-10

## 2025-04-10 NOTE — PATIENT INSTRUCTIONS
TMJ Syndrome / Sprain    This is a condition with chronic or recurrent pain in the joint of the jaw (in front of the ear). Just like all other joints in the body (knees, hips, etc.) the jaw joint can be sprained or chronically injured over time. The jaw joint capsule can wear out just like other joints in the body.    The pain may cause limited motion of the jaw, a locking or catching sensation, clicking, popping, or grinding sounds from the joint with movement. It is a very common cause of headache, earache or neck pain. Other symptoms include ringing in the ear, and pressure/fullness of the ear.    The nerve that gives sensation to the jaw joint also gives sensation to the ear and part of the face. This is why pain in the face or ear can be coming from the jaw joint. It is sometimes caused by inflammation in the joint, injury or wear-and-tear of the cartilage in the joint, involuntary grinding of the teeth or poorly fitting dentures. Emotional stress and tension are often a factor. Most cases resolve completely within a few months with proper treatment.    Home Care:  1) Rest the jaw by avoiding crunchy or hard foods to chew. Do not eat hard or sticky candies. Soft foods and liquids are easier on the jaw. Protect your jaw while yawning.  2) Hot compress (small towel soaked in hot water or heating pad) applied to the jaw may give relief by reducing muscle spasm. Some people get relief with cold packs, so try both and see which one works best for you.  3) You may use ibuprofen (Motrin, Advil) to control pain, unless another medicine was prescribed.   If you weight between 130 and 150 lb, you may take 600 mg of Ibuprofen every 6 hours as needed for a few weeks, then less frequently following this. If you weight > 150 lb, you may take 800 mg every 6 hours for the same time period. Extended use of Ibuprofen is typically OK, but not every 6 hours (usually one or two times per day.)   [ NOTE : If you have chronic liver  "or kidney disease or ever had a stomach ulcer or GI bleeding, talk with your doctor before using these medicines.]  4) If you suspect emotional stress is related to your condition.  a) Try to identify the sources of stress in your life. It may not be obvious! These may include:  -- Daily hassles of life that pile up (traffic jams, missed appointments, car troubles)  -- Major life changes, both good (new baby, job promotion) and bad (loss of job, loss of loved one)  -- Overload: feeling that you have too many responsibilities and can't take care of everything at once  -- Helplessness: feeling like your problems are more than you can solve  b) When possible, do something about the source of your stress: avoid hassles, limit the amount of change that is happening in your life at one time and take a break when you feel overloaded.  c) Unfortunately, many stressful situations cannot be avoided. Therefore, it is necessary to learn HOW TO MANAGE STRESS better. There are many proven methods that work and will reduce your anxiety. These include simple things like exercise, good nutrition and adequate rest. Also, there are certain techniques that are helpful: relaxation and breathing exercises, visualization, biofeedback, meditation or simply taking some time-out to clear your mind. For more information about this, consult your doctor or go to a local bookstore and review the many books and tapes available on this subject.    Mouthguards for Grinding:  Some TMJ issues are worsened by nightly teeth grinding. This can create muscle tension and strain on the jaw joint. Most mouthguards protect the teeth and do NOT reduce the clenching. If the goal is to reduce clenching, I recommend trying an over the counter nightguard called "SmartGuard."  This can be purchased over-the-counter and is available through vendors such as [employer] and [employer].   This mouthguard fits only on the front teeth. As a result, it prevents your molars from " contacting, preventing a forceful clench. This should be worn for brief period of time (weeks or months) as it can alter the bite with prolonged use. Most people will notice improvement during this time.

## 2025-04-10 NOTE — PROGRESS NOTES
"[facility] Note  Staff: JAEL Nguyen    PCP: MD Amy    Chief Complaint: frequency    Subjective:        HPI: Eddie Light is a 29 y.o. male NEW PATIENT presents today for evaluation of urinary frequency. He states he feels the need to urinate every hour during the day and approximately 3-4 times at night. He denies leaking urine on the way to the bathroom. He states this is not a new problem for him. He states he has always felt the need to urinate frequently since a child. He denies dysuria, hematuria, fever, flank pain, and difficulty urinating.    He does have constipation at times. He is unsure if he has had kidney stones in the past. We discussed known bladder irritants. We also discussed a trial of an OAB medication and he agrees.     Questions asked the pt during ov today:  Urgency: yes, urge incontinence? no  NTF: 3-4x night, DTF: every hour  Dysuria: No  Gross Hematuria:No  Straining:No, Hesistancy:No, Intermittency:No}, Weak stream:No    Last PSA Screening: No results found for: "PSA", "PSADIAG"    History of Kidney Stones?:  possible    Constipation issues?:  yes    PVR by bladder scan performed by MA today:  0 mL    REVIEW OF SYSTEMS:  Review of Systems   Constitutional: Negative.  Negative for chills and fever.   Gastrointestinal:  Positive for constipation. Negative for abdominal pain, diarrhea, nausea and vomiting.   Genitourinary:  Positive for frequency and urgency. Negative for dysuria, flank pain and hematuria.   Musculoskeletal: Negative.  Negative for back pain.       PMHx:  Past Medical History:   Diagnosis Date    ADHD (attention deficit hyperactivity disorder)     Growth hormone deficiency 10/18/2013    Localization-related (focal) (partial) symptomatic epilepsy and epileptic syndromes with complex partial seizures, not intractable, without status epilepticus 10/09/2019    Migraine headache 10/24/2022    Short stature for age     Dr. Elena Garcia "    Wears glasses     Dr. Lara       PSHx:  Past Surgical History:   Procedure Laterality Date    HERNIA REPAIR      TYMPANOSTOMY TUBE PLACEMENT         Fam Hx:   malignancies: No    kidney stones: No     Soc Hx:  Lives in [place]    Allergies:  Patient has no known allergies.    Medications: reviewed     Objective:   There were no vitals filed for this visit.    Physical Exam  Constitutional:       Appearance: Normal appearance.   Pulmonary:      Effort: Pulmonary effort is normal.   Abdominal:      General: There is no distension.      Palpations: Abdomen is soft.      Tenderness: There is no abdominal tenderness. There is no right CVA tenderness or left CVA tenderness.   Musculoskeletal:         General: Normal range of motion.      Cervical back: Normal range of motion.   Skin:     General: Skin is warm.   Neurological:      Mental Status: He is oriented to person, place, and time.   Psychiatric:         Mood and Affect: Mood normal.         Behavior: Behavior normal.         LABS REVIEW:  UA today:  Color:Clear, Yellow  Spec. Grav.  1.020  PH  6.0  Negative for leukocytes, nitrates, protein, glucose, ketones, urobili, bili, and blood.    Assessment:       1. Urinary frequency          Plan:     Discussed conservative measures to control urgency and frequency including avoiding bladder irritants, timed voiding, not postponing voiding, and bowel regimen (as distended bowel has extrinsic compressive effect on bladder. Discussed bladder irritants include coffe (even decaf), tea, alcohol, soda, spicy foods, acidic juices (orange, tomato), vinegar, and artificial sweeteners.  CT RSS ordered and scheduled  Oxybutynin 5mg XL prescribed to pt today as trial to see if med improves pt's current LUTS.  Benefits, risks and side affects were thoroughly explained to pt today in office with all questions answered.    F/u per plan    [facility]: active    JAEL Nguyen

## 2025-04-10 NOTE — PROGRESS NOTES
Otolaryngology Clinic Note    Subjective:       Patient ID: Eddie Light is a 29 y.o. male.    Chief Complaint: Consult (TMJ) and Headache      History of Present Illness: Eddie Light is a 29 y.o. male presenting with popping of left jaw when chewing for years. Hurts when chewing a lot. Rarely hurts. Popping is more concerning. Thought it started with elbow to jaw at one point. Never dislocated. Has migraines. Does not know if he grinds or clenches teeth at night. Does not wake up with jaw or temple pain. Did have braces. No known joint issue or autoimmune issues. Has tried some stretching. Looked up some TMJ stretches. He does not snore. Not a mouth breather. No nasal complaints. Gets migraines left side above eye and at temple. Was born premie, had seizures as a child. On keppra.       Past Surgical History:   Procedure Laterality Date    HERNIA REPAIR      TYMPANOSTOMY TUBE PLACEMENT       Past Medical History:   Diagnosis Date    ADHD (attention deficit hyperactivity disorder)     Growth hormone deficiency 10/18/2013    Localization-related (focal) (partial) symptomatic epilepsy and epileptic syndromes with complex partial seizures, not intractable, without status epilepticus 10/09/2019    Migraine headache 10/24/2022    Short stature for age     Dr. Elena Garcia    Wears glasses     Dr. Lara     [facility]     Tobacco Use: Low Risk  (4/10/2025)    Patient History     Smoking Tobacco Use: Never     Smokeless Tobacco Use: Never     Passive Exposure: Not on file   Alcohol Use: Not At Risk (4/2/2025)    AUDIT-C     Frequency of Alcohol Consumption: Never     Average Number of Drinks: Patient does not drink     Frequency of Binge Drinking: Never   Financial Resource Strain: Low Risk  (4/2/2025)    Overall Financial Resource Strain (CARDIA)     Difficulty of Paying Living Expenses: Not hard at all   Food Insecurity: No Food Insecurity (4/2/2025)    Hunger Vital Sign     Worried  About Running Out of Food in the Last Year: Never true     Ran Out of Food in the Last Year: Never true   Transportation Needs: No Transportation Needs (4/2/2025)    PRAPARE - Transportation     Lack of Transportation (Medical): No     Lack of Transportation (Non-Medical): No   Physical Activity: Sufficiently Active (4/2/2025)    Exercise Vital Sign     Days of Exercise per Week: 5 days     Minutes of Exercise per Session: 60 min   Stress: No Stress Concern Present (4/2/2025)    [employer] - Occupational Stress Questionnaire     Feeling of Stress : Not at all   Housing Stability: Low Risk  (4/2/2025)    Housing Stability Vital Sign     Unable to Pay for Housing in the Last Year: No     Number of Times Moved in the Last Year: 0     Homeless in the Last Year: No   Depression: Low Risk  (4/2/2025)    Depression     Last PHQ-4: Flowsheet Data: 0   Utilities: Not At Risk (4/2/2025)    [facility] Utilities     Threatened with loss of utilities: No   Health Literacy: Inadequate Health Literacy (4/2/2025)     Health Literacy     Frequency of need for help with medical instructions: Often   Social Isolation: Not on file     Review of patient's allergies indicates:  No Known Allergies  Current Outpatient Medications   Medication Instructions    levetiracetam XR (KEPPRA XR) 1,500 mg, Oral, Daily    mirtazapine (REMERON) 7.5 mg, Oral, Nightly    multivitamin (THERAGRAN) per tablet 1 tablet, Daily    oxybutynin (DITROPAN-XL) 5 mg, Oral, Daily    rizatriptan (MAXALT-MLT) 10 MG disintegrating tablet Dissolve 1 melt by mouth at onset at headache, second melt 2 hours later if needed, no more than 2 melts day/ 3days use in week    terbinafine HCL (LAMISIL) 250 mg, Oral, Daily         ENT ROS negative except as stated above.     Patient answers are not available for this visit.            Objective:      There were no vitals filed for this visit.    General: NAD, well appearing  Eyes: Normal conjunctiva and  lids  Face: symmetric, nerve intact  Nose: The nose is without any evidence of any deformity. The nasal mucosa is inflamed. The septum is midline. There is no evidence of septal hematoma. The turbinates are without abnormality.   Ears: The ears are with normal-appearing pinna. Examination of the canals is normal appearing bilaterally. There is no drainage or erythema noted. The tympanic membranes are normal appearing with pearly color, normal-appearing landmarks and normal light reflex. Hearing is grossly intact.  Mouth: No obvious abnormalities to the lips. The teeth are unremarkable, mild wear to molars. The gingivae are without any obvious evidence of infection or lesion. The oral mucosa is moist and pink. There are no obvious masses to the hard or soft palate. Left TMJ popping, not tender today.   Oropharynx: The uvula is midline.  The tongue is midline. The posterior pharynx is without erythema or exudate. The tonsils are normal appearing 1+.  Salivary glands: The salivary glands are symmetric and not enlarged, no masses  Neck: No lymphadenopathy, trachea midline, thryoid not enlarged.  Psych: Normal mood and affect.   Neuro: Grossly intact  Speech: fluent    EXAMINATION:  MRI BRAIN WITHOUT CONTRAST     CLINICAL HISTORY:  New onset headache, history of epilepsy;.  Localization-related (focal) (partial) symptomatic epilepsy and epileptic syndromes with complex partial seizures, not intractable, without status epilepticus     TECHNIQUE:  Multiplanar multisequence MR imaging of the brain was performed without the administration of intravenous contrast.     COMPARISON:  CT head 10/07/2015, MRI brain 09/18/2012     FINDINGS:  Redemonstration of subependymal gray matter heterotopia along the lateral ventricles, as seen on multiple prior studies.     Ventricles and sulci are normal in size for age without evidence of hydrocephalus. No extra-axial blood or fluid collections.     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 Hippocampi have normal and symmetric architecture and signal. Diffusion-weighted images demonstrate no evidence of an acute infarct.   Susceptibility weighted images demonstrate no evidence of acute or chronic hemorrhage.     Normal vascular flow voids are preserved.     Bone marrow signal intensity is normal. Paranasal sinuses and mastoid air cells are essentially clear. Orbits are unremarkable.     Impression:     1. No acute intracranial abnormality.  2. 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        Electronically signed by:Thomas Mednoza  Date:                                            11/03/2020     Assessment and Plan:       1. TMJ syndrome    2. Migraine syndrome        TMJ popping,sometimes painful. Is left sided and migraines are as well. Was premie with some MRI changes noted as above with seizure disorder. TMJ pain intermittent, could worsen or trigger migraine issues but may not be related. No sinus disease on MRI or nasal complaints.     TMJ handouts given  Dr. Louise information given for TMJ, is only popping, rare pain. No other ent intervention indicated.     RTC: PRN    Plan of care was discussed in detail with the patient, who agreed with the plan as above. All questions were answered in detail.     Jessica Garcia MD  Otolaryngology

## 2025-04-24 ENCOUNTER — PATIENT MESSAGE (OUTPATIENT)
Dept: FAMILY MEDICINE | Facility: CLINIC | Age: 30
End: 2025-04-24
Payer: COMMERCIAL

## 2025-04-24 NOTE — TELEPHONE ENCOUNTER
Please review and advise if you would like to treat or if we need to send him the link for an [facility].

## 2025-04-28 ENCOUNTER — RESULTS FOLLOW-UP (OUTPATIENT)
Dept: FAMILY MEDICINE | Facility: CLINIC | Age: 30
End: 2025-04-28

## 2025-04-28 ENCOUNTER — OFFICE VISIT (OUTPATIENT)
Dept: FAMILY MEDICINE | Facility: CLINIC | Age: 30
End: 2025-04-28
Payer: COMMERCIAL

## 2025-04-28 ENCOUNTER — HOSPITAL ENCOUNTER (OUTPATIENT)
Dept: RADIOLOGY | Facility: HOSPITAL | Age: 30
Discharge: HOME | End: 2025-04-28
Attending: FAMILY MEDICINE
Payer: COMMERCIAL

## 2025-04-28 VITALS
RESPIRATION RATE: 16 BRPM | HEART RATE: 78 BPM | WEIGHT: 133.81 LBS | OXYGEN SATURATION: 99 % | BODY MASS INDEX: 21 KG/M2 | TEMPERATURE: 98 F | DIASTOLIC BLOOD PRESSURE: 72 MMHG | HEIGHT: 67 IN | SYSTOLIC BLOOD PRESSURE: 108 MMHG

## 2025-04-28 DIAGNOSIS — B35.1 ONYCHOMYCOSIS: ICD-10-CM

## 2025-04-28 DIAGNOSIS — M26.609 TMJ (TEMPOROMANDIBULAR JOINT DISORDER): Primary | ICD-10-CM

## 2025-04-28 DIAGNOSIS — M26.609 TMJ (TEMPOROMANDIBULAR JOINT DISORDER): ICD-10-CM

## 2025-04-28 PROCEDURE — 70328 X-RAY EXAM OF JAW JOINT: CPT | Mod: TC,PN

## 2025-04-28 PROCEDURE — 99999 PR PBB SHADOW E&M-EST. PATIENT-LVL IV: CPT | Mod: PBBFAC,,, | Performed by: FAMILY MEDICINE

## 2025-04-28 PROCEDURE — 70328 X-RAY EXAM OF JAW JOINT: CPT | Mod: 26,,, | Performed by: RADIOLOGY

## 2025-04-28 RX ORDER — TERBINAFINE HYDROCHLORIDE 250 MG/1
250 TABLET ORAL DAILY
Qty: 45 TABLET | Refills: 1 | Status: SHIPPED | OUTPATIENT
Start: 2025-04-28 | End: 2025-07-27

## 2025-04-28 NOTE — PROGRESS NOTES
THIS DOCUMENT WAS MADE IN PART WITH VOICE RECOGNITION SOFTWARE.  OCCASIONALLY THIS SOFTWARE WILL MISINTERPRET WORDS OR PHRASES.    Assessment and Plan:    1. TMJ (temporomandibular joint disorder)  X-Ray Temporomandibular Joints Unilateral      2. Onychomycosis  terbinafine HCL (LAMISIL) 250 mg tablet            Assessment & Plan    PLAN SUMMARY:   Take Miralax daily for 4 weeks for chronic constipation   Continue Remeron for weight gain   Consider referral to [facility] (Dr. Jewell)   Complete ordered CT renal stone study   Continue terbinafine for toenail fungus   Continue Maxalt as needed for migraine headaches   Continue Keppra for seizure disorder at current dose   Ordered XR TMJ to evaluate for bony abnormalities   Contact Dr. Rowell's office to schedule annual seizure disorder follow-up   Follow up in 4 weeks to reassess constipation and urinary symptoms    CONSTIPATION:   Discussed Miralax mechanism of action: draws water into colon, softens stool, and promotes regular bowel movements.   Eddie to take Miralax daily for 4 weeks to address chronic constipation; recommend aiming for bowel movements 5 days a week, up from current 2-3 times per week.   Informed about potential side effects of oxybutynin, including constipation.    SEIZURE DISORDER:   Continued Keppra for seizure disorder at current dose.   Contact Dr. Rowell's office to schedule annual follow-up for seizure disorder monitoring.    MIGRAINE:   Continued Maxalt as needed for migraine headaches.    WEIGHT MANAGEMENT:   Continued Remeron for weight gain.    DERMATOPHYTOSIS:   Continued terbinafine for toenail fungus.    TEMPOROMANDIBULAR JOINT DISORDERS:   Ordered XR TMJ to evaluate for bony abnormalities.   Recommend further investigation and potential referral; consider [facility] (Dr. Jewell) for evaluation and treatment.    UROLITHIASIS:   Complete ordered CT renal stone study  before next visit.    FOLLOW-UP:   Follow up in 4 weeks to reassess constipation and urinary symptoms after consistent Miralax use.             ______________________________________________________________________  Subjective:    Chief Complaint:  Chief Complaint   Patient presents with    Annual Exam     Discuss Seizures and medications        HPI:  Eddie is a 29 y.o. year old     History of Present Illness    CHIEF COMPLAINT:  Eddie presents today for follow up of multiple medical conditions.    NEUROLOGICAL:  His last seizure occurred on May 27th, 2020. He continues Keppra for seizure management. He experiences one severe migraine headache every couple weeks, managed with Maxalt as needed with good response. He uses Excedrin for less severe headaches with symptom resolution.    WEIGHT MANAGEMENT:  He recently started Remeron for weight gain with improved appetite. He has gained 4 lbs in recent weeks, now weighing 133 lbs. Due to his work schedule, he typically consumes one meal daily around 1:00 PM.    GENITOURINARY:  He reports frequent urination, including urinating 5 times during a movie. He was started on oxybutynin for overactive bladder on the 10th by Alem Webster from urology. He has not been compliant with the medication due to concerns about side effects, particularly constipation.    GASTROINTESTINAL:  He has chronic constipation since childhood with bowel movements approximately twice weekly. Previously used Miralax mixed with orange juice in the morning which was effective for symptom management, but has discontinued this regimen.    MUSCULOSKELETAL:  He reports TMJ issues with jaw clicking while eating following a basketball injury to his jaw. The condition has persisted since the injury.    CURRENT MEDICATIONS:  He is currently taking Keppra, Remeron, terbinafine for toenail infection, Maxalt and Excedrin as needed for headaches, and oxybutynin (not taking consistently).    LABS:  Thyroid  function, blood count, liver function, kidney function, and electrolytes are normal. Cholesterol levels are within normal range. No evidence of diabetes. Testosterone levels are low-normal.      ROS:  ROS as indicated in HPI.                   History of seizure, epilepsy  Neurology-Dr. Rowell  Current Rx-Keppra, compliant  Prior AEDs:  Phenobarbital (lethargy)  Zonegran (drowsiness)  Lamictal (drowsiness)  Prev seizure : > 3 years (May 2020)    Migraine HA  Rx: Maxalt   Freq : 1 every 2 weeks     ADHD    Wt Loss/ Trouble gaining wt   Rx : Remeron     Urinary Frequency   Urology : NP Sproles   Rx : oxybutynin     Functional Constipation   OTC : Stool Softner, Miralax   Frequency : 2 x per week         Past Medical History:  Past Medical History:   Diagnosis Date    ADHD (attention deficit hyperactivity disorder)     Growth hormone deficiency 10/18/2013    Localization-related (focal) (partial) symptomatic epilepsy and epileptic syndromes with complex partial seizures, not intractable, without status epilepticus 10/09/2019    Migraine headache 10/24/2022    Short stature for age     Dr. Elena Garcia    Wears glasses     Dr. Lara       Past Surgical History:  Past Surgical History:   Procedure Laterality Date    HERNIA REPAIR      TYMPANOSTOMY TUBE PLACEMENT         Family History:  Family History   Problem Relation Name Age of Onset    Hypertension Maternal Grandmother Merlin Strickland     Cancer Maternal Grandmother Merlin Strickland     Heart disease Maternal Grandfather      COPD Paternal Grandmother Georgie Nungesser     Hypertension Paternal Grandmother Georgie Nungesser     COPD Paternal Grandfather Murtaza Nungesser     Hypertension Paternal Grandfather Murtaza Alvarezesser        Social History:  Social History     Socioeconomic History    Marital status: Single   Tobacco Use    Smoking status: Never     Passive exposure: Never    Smokeless tobacco: Never   Substance and Sexual Activity    Alcohol use: Not  Currently     Alcohol/week: 1.0 standard drink of alcohol     Types: 1 Unspecified drink type per week     Comment: very rare    Drug use: No    Sexual activity: Not Currently     Partners: Female     Social Drivers of Health     Financial Resource Strain: Low Risk  (4/2/2025)    Overall Financial Resource Strain (CARDIA)     Difficulty of Paying Living Expenses: Not hard at all   Food Insecurity: No Food Insecurity (4/2/2025)    Hunger Vital Sign     Worried About Running Out of Food in the Last Year: Never true     Ran Out of Food in the Last Year: Never true   Transportation Needs: No Transportation Needs (4/2/2025)    PRAPARE - Transportation     Lack of Transportation (Medical): No     Lack of Transportation (Non-Medical): No   Physical Activity: Sufficiently Active (4/2/2025)    Exercise Vital Sign     Days of Exercise per Week: 5 days     Minutes of Exercise per Session: 60 min   Stress: No Stress Concern Present (4/2/2025)    [employer] [place] [employer] - Occupational Stress Questionnaire     Feeling of Stress : Not at all   Housing Stability: Low Risk  (4/2/2025)    Housing Stability Vital Sign     Unable to Pay for Housing in the Last Year: No     Number of Times Moved in the Last Year: 0     Homeless in the Last Year: No       Medications:  Medications Ordered Prior to Encounter[1]    Allergies:  Patient has no known allergies.    Immunizations:  Immunization History   Administered Date(s) Administered    COVID-19, MRNA, LN-S, PF (Pfizer) ([employer]) 03/23/2022    COVID-19, vector-nr, rS-Ad26, PF ([employer]) 03/31/2021    DTaP 1997, 01/16/1998, 04/07/1998, 10/09/1998, 10/02/2003    HIB 1997, 01/16/1998, 04/07/1998, 10/09/1998    HPV 9-Valent 10/23/2020    Hepatitis B, Pediatric/Adolescent 1997, 1997, 1997, 03/23/2009    IPV 1997, 01/16/1998, 04/07/1998, 10/09/1998, 10/02/2003    Influenza 12/16/2002, 11/25/2006, 11/28/2007, 10/03/2019    Influenza (Flumist) -  "Quadrivalent - Intranasal *Preferred* (2-49 years old) 10/18/2013, 12/18/2014, 12/17/2015    Influenza - Intranasal 10/23/2012, 10/18/2013    Influenza - Intranasal - Trivalent 10/21/2010, 09/14/2011, 10/23/2012    Influenza - Quadrivalent - PF *Preferred* (6 months and older) 12/16/2002, 11/25/2006, 11/15/2017, 10/05/2018, 10/05/2018, 10/04/2019, 10/06/2020, 10/06/2020, 10/24/2022    Influenza - Trivalent (PED) 12/16/2002, 11/25/2006    MMR 06/30/1998, 10/02/2003    Meningococcal Conjugate (MCV4P) 03/17/2009    Tdap 03/23/2009, 10/23/2020    Varicella 06/30/1998, 03/17/2009       Review of Systems:  Review of Systems   All other systems reviewed and are negative.      Objective:    Vitals:  Vitals:    04/28/25 1023   BP: 108/72   Pulse: 78   Resp: 16   Temp: 98 °F (36.7 °C)   TempSrc: Oral   SpO2: 99%   Weight: 60.7 kg (133 lb 13.1 oz)   Height: 5' 7" (1.702 m)   PainSc: 0-No pain       Physical Exam  Vitals reviewed.   Constitutional:       General: He is not in acute distress.  HENT:      Head: Normocephalic and atraumatic.   Eyes:      Pupils: Pupils are equal, round, and reactive to light.   Cardiovascular:      Rate and Rhythm: Normal rate and regular rhythm.      Heart sounds: No murmur heard.     No friction rub.   Pulmonary:      Effort: Pulmonary effort is normal.      Breath sounds: Normal breath sounds.   Abdominal:      General: Bowel sounds are normal. There is no distension.      Palpations: Abdomen is soft.      Tenderness: There is no abdominal tenderness.   Musculoskeletal:      Cervical back: Neck supple.   Skin:     General: Skin is warm and dry.      Findings: No rash.   Psychiatric:         Behavior: Behavior normal.             Calixto Reyes MD  Family Medicine           [1]   Current Outpatient Medications on File Prior to Visit   Medication Sig Dispense Refill    levetiracetam XR (KEPPRA XR) 500 mg Tb24 24 hr tablet Take 3 tablets (1,500 mg total) by mouth once daily. 270 tablet 3    " mirtazapine (REMERON) 7.5 MG Tab Take 1 tablet (7.5 mg total) by mouth every evening. 30 tablet 11    multivitamin (THERAGRAN) per tablet Take 1 tablet by mouth once daily.      oxybutynin (DITROPAN-XL) 5 MG TR24 Take 1 tablet (5 mg total) by mouth once daily. 30 tablet 11    rizatriptan (MAXALT-MLT) 10 MG disintegrating tablet Dissolve 1 melt by mouth at onset at headache, second melt 2 hours later if needed, no more than 2 melts day/ 3days use in week 12 tablet 5    [DISCONTINUED] terbinafine HCL (LAMISIL) 250 mg tablet Take 1 tablet (250 mg total) by mouth once daily. 45 tablet 1    [DISCONTINUED] dexmethylphenidate (FOCALIN XR) 30 mg 24 hr capsule Take 30 mg by mouth once daily.       No current facility-administered medications on file prior to visit.

## 2025-05-28 ENCOUNTER — OFFICE VISIT (OUTPATIENT)
Dept: FAMILY MEDICINE | Facility: CLINIC | Age: 30
End: 2025-05-28
Payer: COMMERCIAL

## 2025-05-28 VITALS
DIASTOLIC BLOOD PRESSURE: 79 MMHG | BODY MASS INDEX: 21.18 KG/M2 | HEART RATE: 68 BPM | RESPIRATION RATE: 16 BRPM | SYSTOLIC BLOOD PRESSURE: 106 MMHG | TEMPERATURE: 98 F | WEIGHT: 139.75 LBS | HEIGHT: 68 IN | OXYGEN SATURATION: 97 %

## 2025-05-28 DIAGNOSIS — G43.809 OTHER MIGRAINE WITHOUT STATUS MIGRAINOSUS, NOT INTRACTABLE: Chronic | ICD-10-CM

## 2025-05-28 DIAGNOSIS — G40.209 LOCALIZATION-RELATED (FOCAL) (PARTIAL) SYMPTOMATIC EPILEPSY AND EPILEPTIC SYNDROMES WITH COMPLEX PARTIAL SEIZURES, NOT INTRACTABLE, WITHOUT STATUS EPILEPTICUS: Chronic | ICD-10-CM

## 2025-05-28 DIAGNOSIS — Z00.00 WELLNESS EXAMINATION: Primary | ICD-10-CM

## 2025-05-28 DIAGNOSIS — K59.09 CHRONIC CONSTIPATION: ICD-10-CM

## 2025-05-28 DIAGNOSIS — Z23 IMMUNIZATION DUE: ICD-10-CM

## 2025-05-28 DIAGNOSIS — R09.89 SYMPTOMS OF UPPER RESPIRATORY INFECTION (URI): ICD-10-CM

## 2025-05-28 LAB
CTP QC/QA: YES
CTP QC/QA: YES
POC MOLECULAR INFLUENZA A AGN: NEGATIVE
POC MOLECULAR INFLUENZA B AGN: NEGATIVE
SARS-COV-2 RDRP RESP QL NAA+PROBE: NEGATIVE

## 2025-05-28 PROCEDURE — 3044F HG A1C LEVEL LT 7.0%: CPT | Mod: CPTII,S$GLB,, | Performed by: FAMILY MEDICINE

## 2025-05-28 PROCEDURE — 3078F DIAST BP <80 MM HG: CPT | Mod: CPTII,S$GLB,, | Performed by: FAMILY MEDICINE

## 2025-05-28 PROCEDURE — 3008F BODY MASS INDEX DOCD: CPT | Mod: CPTII,S$GLB,, | Performed by: FAMILY MEDICINE

## 2025-05-28 PROCEDURE — 1159F MED LIST DOCD IN RCRD: CPT | Mod: CPTII,S$GLB,, | Performed by: FAMILY MEDICINE

## 2025-05-28 PROCEDURE — 87635 SARS-COV-2 COVID-19 AMP PRB: CPT | Mod: QW,S$GLB,, | Performed by: FAMILY MEDICINE

## 2025-05-28 PROCEDURE — 99999 PR PBB SHADOW E&M-EST. PATIENT-LVL IV: CPT | Mod: PBBFAC,,, | Performed by: FAMILY MEDICINE

## 2025-05-28 PROCEDURE — 3074F SYST BP LT 130 MM HG: CPT | Mod: CPTII,S$GLB,, | Performed by: FAMILY MEDICINE

## 2025-05-28 PROCEDURE — 87502 INFLUENZA DNA AMP PROBE: CPT | Mod: QW,S$GLB,, | Performed by: FAMILY MEDICINE

## 2025-05-28 PROCEDURE — 99395 PREV VISIT EST AGE 18-39: CPT | Mod: 25,S$GLB,, | Performed by: FAMILY MEDICINE

## 2025-05-28 NOTE — PROGRESS NOTES
THIS DOCUMENT WAS MADE IN PART WITH VOICE RECOGNITION SOFTWARE.  OCCASIONALLY THIS SOFTWARE WILL MISINTERPRET WORDS OR PHRASES.    Assessment and Plan:    1. Wellness examination        2. Immunization due        3. Localization-related (focal) (partial) symptomatic epilepsy and epileptic syndromes with complex partial seizures, not intractable, without status epilepticus        4. Other migraine without status migrainosus, not intractable        5. Symptoms of upper respiratory infection (URI)  POCT Influenza A/B Molecular    POCT COVID-19 Rapid Screening      6. Chronic constipation  Case Request Endoscopy: COLONOSCOPY              Assessment & Plan    PLAN SUMMARY:  > Referred to neurologist Dr. Loo  > Ordered flu and COVID swabs  > Start Prednisone if flu and COVID tests negative  > Start Tamiflu if flu test positive  > Start Paxlovid if COVID test positive  > Prescribe oxybutynin with MiraLAX to prevent constipation  > Increase fluid intake  > Contact office if headaches worsen or require different treatment  > Contact office if experiencing constipation while taking oxybutynin    PLAN NOTE:  > Start Tamiflu if flu test is positive.  > Start Paxlovid if COVID test is positive.  > Start Prednisone if both flu and COVID tests are negative.  > Ordered flu and COVID swabs to rule out these infections, despite being late in flu season.  > Eddie to take MiraLAX when using oxybutynin to prevent constipation and increase fluid intake.  > Referred to Dr. Loo (neurologist) for appointment.  > Contact the office if headaches become more frequent or require different treatment.  > Contact the office if experiencing trouble with constipation while taking oxybutynin.           Chronic constipation-check diagnostic colonoscopy  Has history of prematurity, patient concerned about structural anomalies contributing to symptoms  ______________________________________________________________________  Subjective:    Chief  Complaint:  Chief Complaint   Patient presents with    Follow-up    Sinus Problem        HPI:  Eddie is a 29 y.o. year old     History of Present Illness    CHIEF COMPLAINT:  Eddie presents today for sinus problems and routine follow up.    UPPER RESPIRATORY SYMPTOMS:  He reports onset of sinus symptoms starting yesterday including cough, nasal congestion, and sore throat. He experienced a headache yesterday that is now improving and felt feverish but temperature was not checked.    MIGRAINE:  He experiences headaches approximately every two weeks that respond well to Maxalt.    NEUROLOGICAL HISTORY:  He denies any seizures since 2020.    GENITOURINARY:  He reports good response to oxybutynin for urinary frequency. He recently discontinued the medication temporarily but noticed increased urinary frequency, prompting plans to resume the medication.    WEIGHT MANAGEMENT:  He has gained 15-20 lbs with mirtazapine 7.5 mg and is now maintaining weight at 140 lbs, which represents significant improvement from previous years.      ROS:  ROS as indicated in HPI.                   History of seizure, epilepsy  Neurology-Dr. Rowell ---> MD Anna Marie   Current Rx-Keppra, compliant  Prior AEDs:  Phenobarbital (lethargy)  Zonegran (drowsiness)  Lamictal (drowsiness)  Prev seizure : > 5 years (May 2020)    Migraine HA  Rx: Maxalt   Freq : 1 every 2 weeks     ADHD    Wt Loss/ Trouble gaining wt   Rx : Remeron     Urinary Frequency   Urology : NP Herman   Rx : oxybutynin     Functional Constipation   OTC : Stool Softner, Miralax   Frequency : 2 x per week         Past Medical History:  Past Medical History:   Diagnosis Date    ADHD (attention deficit hyperactivity disorder)     Growth hormone deficiency 10/18/2013    Localization-related (focal) (partial) symptomatic epilepsy and epileptic syndromes with complex partial seizures, not intractable, without status epilepticus 10/09/2019    Migraine headache 10/24/2022    Short  stature for age     Dr. Elena Garcia    Wears glasses     Dr. Lara       Past Surgical History:  Past Surgical History:   Procedure Laterality Date    HERNIA REPAIR      TYMPANOSTOMY TUBE PLACEMENT         Family History:  Family History   Problem Relation Name Age of Onset    Hypertension Maternal Grandmother Merlin Strickland     Cancer Maternal Grandmother Merlin Strickland     Heart disease Maternal Grandfather      COPD Paternal Grandmother Georgie Light     Hypertension Paternal Grandmother Georgie Alvarezesser     COPD Paternal Grandfather Murtaza Light     Hypertension Paternal Grandfather Murtaza Light        Social History:  Social History     Socioeconomic History    Marital status: Single   Tobacco Use    Smoking status: Never     Passive exposure: Never    Smokeless tobacco: Never   Substance and Sexual Activity    Alcohol use: Not Currently     Alcohol/week: 1.0 standard drink of alcohol     Types: 1 Unspecified drink type per week     Comment: very rare    Drug use: No    Sexual activity: Not Currently     Partners: Female     Social Drivers of Health     Financial Resource Strain: Low Risk  (4/2/2025)    Overall Financial Resource Strain (CARDIA)     Difficulty of Paying Living Expenses: Not hard at all   Food Insecurity: No Food Insecurity (4/2/2025)    Hunger Vital Sign     Worried About Running Out of Food in the Last Year: Never true     Ran Out of Food in the Last Year: Never true   Transportation Needs: No Transportation Needs (4/2/2025)    PRAPARE - Transportation     Lack of Transportation (Medical): No     Lack of Transportation (Non-Medical): No   Physical Activity: Sufficiently Active (4/2/2025)    Exercise Vital Sign     Days of Exercise per Week: 5 days     Minutes of Exercise per Session: 60 min   Stress: No Stress Concern Present (4/2/2025)    [employer] [employer] - Occupational Stress Questionnaire     Feeling of Stress : Not at all   Housing  "Stability: Low Risk  (4/2/2025)    Housing Stability Vital Sign     Unable to Pay for Housing in the Last Year: No     Number of Times Moved in the Last Year: 0     Homeless in the Last Year: No       Medications:  Medications Ordered Prior to Encounter[1]    Allergies:  Patient has no known allergies.    Immunizations:  Immunization History   Administered Date(s) Administered    COVID-19, MRNA, LN-S, PF (Pfizer) ([employer]) 03/23/2022    COVID-19, vector-nr, rS-Ad26, PF ([employer]) 03/31/2021    DTaP 1997, 01/16/1998, 04/07/1998, 10/09/1998, 10/02/2003    HIB 1997, 01/16/1998, 04/07/1998, 10/09/1998    HPV 9-Valent 10/23/2020    Hepatitis B, Pediatric/Adolescent 1997, 1997, 1997, 03/23/2009    IPV 1997, 01/16/1998, 04/07/1998, 10/09/1998, 10/02/2003    Influenza 12/16/2002, 11/25/2006, 11/28/2007, 10/03/2019    Influenza (Flumist) - Quadrivalent - Intranasal *Preferred* (2-49 years old) 10/18/2013, 12/18/2014, 12/17/2015    Influenza - Intranasal 10/23/2012, 10/18/2013    Influenza - Intranasal - Trivalent 10/21/2010, 09/14/2011, 10/23/2012    Influenza - Quadrivalent - PF *Preferred* (6 months and older) 12/16/2002, 11/25/2006, 11/15/2017, 10/05/2018, 10/05/2018, 10/04/2019, 10/06/2020, 10/06/2020, 10/24/2022    Influenza - Trivalent (PED) 12/16/2002, 11/25/2006    MMR 06/30/1998, 10/02/2003    Meningococcal Conjugate (MCV4P) 03/17/2009    Tdap 03/23/2009, 10/23/2020    Varicella 06/30/1998, 03/17/2009       Review of Systems:  Review of Systems   All other systems reviewed and are negative.      Objective:    Vitals:  Vitals:    05/28/25 0948   BP: 106/79   Pulse: 68   Resp: 16   Temp: 97.7 °F (36.5 °C)   TempSrc: Oral   SpO2: 97%   Weight: 63.4 kg (139 lb 12.4 oz)   Height: 5' 8" (1.727 m)   PainSc: 0-No pain       Physical Exam  Vitals reviewed.   Constitutional:       General: He is not in acute distress.     Appearance: He is well-developed.   HENT:      Head: Normocephalic " and atraumatic.      Nose: Mucosal edema and rhinorrhea present.      Mouth/Throat:      Pharynx: Posterior oropharyngeal erythema present. No oropharyngeal exudate.   Eyes:      Pupils: Pupils are equal, round, and reactive to light.   Cardiovascular:      Rate and Rhythm: Normal rate and regular rhythm.      Heart sounds: No murmur heard.     No friction rub.   Pulmonary:      Effort: Pulmonary effort is normal.      Breath sounds: Normal breath sounds.   Abdominal:      General: Bowel sounds are normal. There is no distension.      Palpations: Abdomen is soft.      Tenderness: There is no abdominal tenderness.   Musculoskeletal:      Cervical back: Normal range of motion and neck supple.   Skin:     General: Skin is warm and dry.      Findings: No rash.   Psychiatric:         Behavior: Behavior normal.             Calixto Reyes MD  Family Medicine             [1]   Current Outpatient Medications on File Prior to Visit   Medication Sig Dispense Refill    levetiracetam XR (KEPPRA XR) 500 mg Tb24 24 hr tablet Take 3 tablets (1,500 mg total) by mouth once daily. 270 tablet 3    mirtazapine (REMERON) 7.5 MG Tab Take 1 tablet (7.5 mg total) by mouth every evening. 30 tablet 11    multivitamin (THERAGRAN) per tablet Take 1 tablet by mouth once daily.      oxybutynin (DITROPAN-XL) 5 MG TR24 Take 1 tablet (5 mg total) by mouth once daily. 30 tablet 11    rizatriptan (MAXALT-MLT) 10 MG disintegrating tablet Dissolve 1 melt by mouth at onset at headache, second melt 2 hours later if needed, no more than 2 melts day/ 3days use in week 12 tablet 5    terbinafine HCL (LAMISIL) 250 mg tablet Take 1 tablet (250 mg total) by mouth once daily. 45 tablet 1    [DISCONTINUED] dexmethylphenidate (FOCALIN XR) 30 mg 24 hr capsule Take 30 mg by mouth once daily.       No current facility-administered medications on file prior to visit.

## 2025-06-19 ENCOUNTER — TELEPHONE (OUTPATIENT)
Dept: GASTROENTEROLOGY | Facility: CLINIC | Age: 30
End: 2025-06-19
Payer: COMMERCIAL

## 2025-06-19 NOTE — TELEPHONE ENCOUNTER
Attempted to reach pt regarding cscope ordered, left VM stating pt will need office visit before scheduling cscope.   Call back number provided.

## 2025-06-30 DIAGNOSIS — G40.909 SEIZURE DISORDER: ICD-10-CM

## 2025-06-30 RX ORDER — LEVETIRACETAM 500 MG/1
1500 TABLET, EXTENDED RELEASE ORAL DAILY
Qty: 270 TABLET | Refills: 3 | Status: SHIPPED | OUTPATIENT
Start: 2025-06-30

## 2025-07-28 DIAGNOSIS — B35.1 ONYCHOMYCOSIS: ICD-10-CM

## 2025-07-28 RX ORDER — TERBINAFINE HYDROCHLORIDE 250 MG/1
250 TABLET ORAL DAILY
Qty: 45 TABLET | Refills: 1 | Status: CANCELLED | OUTPATIENT
Start: 2025-07-28 | End: 2025-10-26

## 2025-07-29 RX ORDER — TERBINAFINE HYDROCHLORIDE 250 MG/1
250 TABLET ORAL DAILY
Qty: 45 TABLET | Refills: 1 | Status: SHIPPED | OUTPATIENT
Start: 2025-07-29 | End: 2025-10-27